# Patient Record
Sex: FEMALE | Race: BLACK OR AFRICAN AMERICAN | NOT HISPANIC OR LATINO | Employment: FULL TIME | URBAN - METROPOLITAN AREA
[De-identification: names, ages, dates, MRNs, and addresses within clinical notes are randomized per-mention and may not be internally consistent; named-entity substitution may affect disease eponyms.]

---

## 2017-04-28 ENCOUNTER — HOSPITAL ENCOUNTER (OUTPATIENT)
Dept: MAMMOGRAPHY | Facility: HOSPITAL | Age: 57
Discharge: HOME | End: 2017-04-28
Attending: FAMILY MEDICINE | Admitting: FAMILY MEDICINE

## 2017-04-28 DIAGNOSIS — R92.8 ABNORMAL MAMMOGRAM: ICD-10-CM

## 2017-04-28 PROCEDURE — G0279 TOMOSYNTHESIS, MAMMO: HCPCS

## 2017-04-28 PROCEDURE — G0204 DX MAMMO INCL CAD BI: HCPCS

## 2017-04-28 PROCEDURE — 77062 BREAST TOMOSYNTHESIS BI: CPT | Performed by: RADIOLOGY

## 2017-04-28 PROCEDURE — 77066 DX MAMMO INCL CAD BI: CPT | Performed by: RADIOLOGY

## 2017-09-17 ENCOUNTER — HOSPITAL ENCOUNTER (EMERGENCY)
Facility: HOSPITAL | Age: 57
Discharge: HOME | End: 2017-09-17
Attending: EMERGENCY MEDICINE | Admitting: EMERGENCY MEDICINE

## 2017-09-17 VITALS
DIASTOLIC BLOOD PRESSURE: 91 MMHG | HEART RATE: 101 BPM | SYSTOLIC BLOOD PRESSURE: 131 MMHG | RESPIRATION RATE: 18 BRPM | TEMPERATURE: 98.2 F | OXYGEN SATURATION: 100 % | BODY MASS INDEX: 31.65 KG/M2 | HEIGHT: 65 IN | WEIGHT: 190 LBS

## 2017-09-17 DIAGNOSIS — S39.012A LUMBAR STRAIN, INITIAL ENCOUNTER: Primary | ICD-10-CM

## 2017-09-17 PROCEDURE — 99282 EMERGENCY DEPT VISIT SF MDM: CPT

## 2017-09-17 NOTE — ED PROVIDER NOTES
Subjective   HPI Comments: Susannah Romero is a 57 y.o. female with hypertension who presents to the ED s/p a fall. The patient reports that she slipped on a grape, twisted her left ankle and fell on a shelf of apples at 1030 today. She did not hit the floor but states that she twisted her body. The patient denies any LOC. She now complains of mid and lower back pain, some pain down her leg, and some minor ankle pain. She has tried taking aleve with minor relief. The patient is ambulatory and has no issues bearing weight on her ankle. She has no other complains at this time.     Patient is a 57 y.o. female presenting with fall.   History provided by:  Patient  Fall   Mechanism of injury: fall    Injury location:  Torso  Torso injury location:  Back  Incident location: [facility].  Time since incident: Onset 1030.  Arrived directly from scene: no    Fall:     Fall occurred:  Walking    Impact surface:  Unable to specify    Point of impact:  Unable to specify  Protective equipment: none    Suspicion of alcohol use: no    Suspicion of drug use: no    Tetanus status:  Unknown  Associated symptoms: back pain (Mid and lower)    Associated symptoms: no loss of consciousness        Review of Systems   Genitourinary:        No incontinence.   Musculoskeletal: Positive for back pain (Mid and lower). Negative for gait problem.   Neurological: Negative for loss of consciousness.        No classic sciatic symptoms.  No bowel or bladder issues.       History reviewed. No pertinent past medical history.    Allergies   Allergen Reactions   • Cephalexin Itching       Past Surgical History:   Procedure Laterality Date   • HYSTERECTOMY     • OOPHORECTOMY         Family History   Problem Relation Age of Onset   • Breast cancer Neg Hx    • Ovarian cancer Neg Hx        Social History     Social History   • Marital status:      Spouse name: N/A   • Number of children: N/A   • Years of education: N/A     Social History Main Topics   •  "Smoking status: Never Smoker   • Smokeless tobacco: None   • Alcohol use No   • Drug use: No   • Sexual activity: Defer     Other Topics Concern   • None     Social History Narrative   • None         Objective   Physical Exam   Constitutional: She is oriented to person, place, and time. She appears well-developed and well-nourished. No distress.   HENT:   Head: Normocephalic and atraumatic.   Eyes: Conjunctivae are normal. No scleral icterus.   Pulmonary/Chest: Effort normal and breath sounds normal. No respiratory distress.   Lungs are clear   Abdominal: Soft. There is tenderness.   Left flank tenderness   Musculoskeletal: Normal range of motion. She exhibits tenderness.   There are no bruises noted on her back. Tenderness to mid and lumbar spine and sacrum. Tenderness to palpation in lower paraspinal muscles left greater than right. Ankles are benign with good ROM and normal ROM for extremities      Neurological: She is alert and oriented to person, place, and time.   Straight leg raise test negative for sciatic pain 5/5 proximally and distally for both legs.   Skin: Skin is warm and dry.   Psychiatric: She has a normal mood and affect. Her behavior is normal.   Nursing note and vitals reviewed.      Procedures         ED Course  ED Course     No results found for this or any previous visit (from the past 24 hour(s)).  Note: In addition to lab results from this visit, the labs listed above may include labs taken at another facility or during a different encounter within the last 24 hours. Please correlate lab times with ED admission and discharge times for further clarification of the services performed during this visit.    No orders to display     Vitals:    09/17/17 1447 09/17/17 1448   BP:  131/91   Patient Position:  Sitting   Pulse:  101   Resp:  18   Temp: 98.2 °F (36.8 °C)    TempSrc: Oral    SpO2:  100%   Weight:  190 lb (86.2 kg)   Height:  65\" (165.1 cm)     Medications - No data to display  ECG/EMG " Results (last 24 hours)     ** No results found for the last 24 hours. **                        [facility]    Final diagnoses:   Lumbar strain, initial encounter       Documentation assistance provided by bailee Pelletier.  Information recorded by the scribe was done at my direction and has been verified and validated by me.     Jaguar Pelletier  09/17/17 3129       Kenneth Fagan MD  09/20/17 1312

## 2018-01-02 ENCOUNTER — TRANSCRIBE ORDERS (OUTPATIENT)
Dept: DIABETES SERVICES | Facility: HOSPITAL | Age: 58
End: 2018-01-02

## 2018-01-02 DIAGNOSIS — R73.03 PREDIABETES: Primary | ICD-10-CM

## 2018-01-03 ENCOUNTER — TRANSCRIBE ORDERS (OUTPATIENT)
Dept: ADMINISTRATIVE | Facility: HOSPITAL | Age: 58
End: 2018-01-03

## 2018-01-03 DIAGNOSIS — R10.31 RLQ ABDOMINAL PAIN: Primary | ICD-10-CM

## 2018-01-08 ENCOUNTER — APPOINTMENT (OUTPATIENT)
Dept: CT IMAGING | Facility: HOSPITAL | Age: 58
End: 2018-01-08

## 2018-01-08 ENCOUNTER — HOSPITAL ENCOUNTER (OUTPATIENT)
Dept: CT IMAGING | Facility: HOSPITAL | Age: 58
Discharge: HOME | End: 2018-01-08
Admitting: NURSE PRACTITIONER

## 2018-01-08 DIAGNOSIS — R10.31 RLQ ABDOMINAL PAIN: ICD-10-CM

## 2018-01-08 PROCEDURE — 0 DIATRIZOATE MEGLUMINE & SODIUM PER 1 ML: Performed by: NURSE PRACTITIONER

## 2018-01-08 PROCEDURE — 74176 CT ABD & PELVIS W/O CONTRAST: CPT

## 2018-01-08 RX ADMIN — DIATRIZOATE MEGLUMINE AND DIATRIZOATE SODIUM 15 ML: 660; 100 LIQUID ORAL; RECTAL at 08:00

## 2018-02-12 ENCOUNTER — HOSPITAL ENCOUNTER (OUTPATIENT)
Dept: DIABETES SERVICES | Facility: HOSPITAL | Age: 58
Setting detail: RECURRING SERIES
Discharge: HOME | End: 2018-02-12

## 2018-03-07 ENCOUNTER — TRANSCRIBE ORDERS (OUTPATIENT)
Dept: ADMINISTRATIVE | Facility: HOSPITAL | Age: 58
End: 2018-03-07

## 2018-03-07 DIAGNOSIS — R92.8 ABNORMAL MAMMOGRAPHY: Primary | ICD-10-CM

## 2018-03-21 ENCOUNTER — HOSPITAL ENCOUNTER (OUTPATIENT)
Dept: DIABETES SERVICES | Facility: HOSPITAL | Age: 58
Setting detail: RECURRING SERIES
Discharge: HOME | End: 2018-03-21

## 2018-05-22 ENCOUNTER — HOSPITAL ENCOUNTER (OUTPATIENT)
Dept: MAMMOGRAPHY | Facility: HOSPITAL | Age: 58
Discharge: HOME | End: 2018-05-22
Attending: FAMILY MEDICINE | Admitting: FAMILY MEDICINE

## 2018-05-22 DIAGNOSIS — R92.8 ABNORMAL MAMMOGRAPHY: ICD-10-CM

## 2018-05-22 PROCEDURE — G0279 TOMOSYNTHESIS, MAMMO: HCPCS

## 2018-05-22 PROCEDURE — 77066 DX MAMMO INCL CAD BI: CPT | Performed by: RADIOLOGY

## 2018-05-22 PROCEDURE — 77066 DX MAMMO INCL CAD BI: CPT

## 2018-05-22 PROCEDURE — 77062 BREAST TOMOSYNTHESIS BI: CPT | Performed by: RADIOLOGY

## 2018-06-22 NOTE — DISCHARGE INSTRUCTIONS
Follow up with Dr. Barton if symptoms do not improve in 1 week.    Limit activity as tolerated    Immediately return to the emergency department for any new or worsening symptoms.      none

## 2019-02-08 ENCOUNTER — LAB REQUISITION (OUTPATIENT)
Dept: LAB | Facility: HOSPITAL | Age: 59
End: 2019-02-08

## 2019-02-08 DIAGNOSIS — Z00.00 ROUTINE GENERAL MEDICAL EXAMINATION AT A HEALTH CARE FACILITY: ICD-10-CM

## 2019-02-08 LAB — D DIMER PPP FEU-MCNC: 1.79 MCGFEU/ML (ref 0–0.56)

## 2019-02-08 PROCEDURE — 85379 FIBRIN DEGRADATION QUANT: CPT

## 2019-02-11 ENCOUNTER — TRANSCRIBE ORDERS (OUTPATIENT)
Dept: ADMINISTRATIVE | Facility: HOSPITAL | Age: 59
End: 2019-02-11

## 2019-02-11 DIAGNOSIS — R79.89 ELEVATED D-DIMER: Primary | ICD-10-CM

## 2019-02-12 ENCOUNTER — HOSPITAL ENCOUNTER (OUTPATIENT)
Dept: CT IMAGING | Facility: HOSPITAL | Age: 59
Discharge: HOME | End: 2019-02-12
Admitting: FAMILY MEDICINE

## 2019-02-12 DIAGNOSIS — R79.89 ELEVATED D-DIMER: ICD-10-CM

## 2019-02-12 PROCEDURE — 82565 ASSAY OF CREATININE: CPT

## 2019-02-12 PROCEDURE — 0 IOPAMIDOL PER 1 ML: Performed by: FAMILY MEDICINE

## 2019-02-12 PROCEDURE — 71275 CT ANGIOGRAPHY CHEST: CPT

## 2019-02-12 RX ADMIN — IOPAMIDOL 80 ML: 755 INJECTION, SOLUTION INTRAVENOUS at 09:35

## 2019-02-12 NOTE — NURSING NOTE
Office called and spoke with Dr. Patel. Information relayed regarding the preliminary results CT Chest. Positive for PE, details discussed per Dr. Denney's findings. Full report to follow in [facility].

## 2019-02-13 LAB — CREAT BLDA-MCNC: 0.8 MG/DL (ref 0.6–1.3)

## 2019-02-22 ENCOUNTER — TRANSCRIBE ORDERS (OUTPATIENT)
Dept: ADMINISTRATIVE | Facility: HOSPITAL | Age: 59
End: 2019-02-22

## 2019-02-22 DIAGNOSIS — I26.99 MULTIPLE PULMONARY EMBOLI (HCC): Primary | ICD-10-CM

## 2019-02-26 ENCOUNTER — HOSPITAL ENCOUNTER (OUTPATIENT)
Dept: CARDIOLOGY | Facility: HOSPITAL | Age: 59
Discharge: HOME | End: 2019-02-26
Admitting: FAMILY MEDICINE

## 2019-02-26 VITALS — HEIGHT: 63 IN | BODY MASS INDEX: 30.65 KG/M2 | WEIGHT: 173 LBS

## 2019-02-26 DIAGNOSIS — I26.99 MULTIPLE PULMONARY EMBOLI (HCC): ICD-10-CM

## 2019-02-26 PROCEDURE — 93970 EXTREMITY STUDY: CPT

## 2019-02-26 PROCEDURE — 93970 EXTREMITY STUDY: CPT | Performed by: INTERNAL MEDICINE

## 2019-02-27 LAB
BH CV LOWER VASCULAR LEFT COMMON FEMORAL AUGMENT: NORMAL
BH CV LOWER VASCULAR LEFT COMMON FEMORAL COMPRESS: NORMAL
BH CV LOWER VASCULAR LEFT COMMON FEMORAL PHASIC: NORMAL
BH CV LOWER VASCULAR LEFT COMMON FEMORAL SPONT: NORMAL
BH CV LOWER VASCULAR LEFT DISTAL FEMORAL AUGMENT: NORMAL
BH CV LOWER VASCULAR LEFT DISTAL FEMORAL COMPRESS: NORMAL
BH CV LOWER VASCULAR LEFT DISTAL FEMORAL PHASIC: NORMAL
BH CV LOWER VASCULAR LEFT DISTAL FEMORAL SPONT: NORMAL
BH CV LOWER VASCULAR LEFT GASTRONEMIUS COMPRESS: NORMAL
BH CV LOWER VASCULAR LEFT GREATER SAPH AK COMPRESS: NORMAL
BH CV LOWER VASCULAR LEFT GREATER SAPH BK COMPRESS: NORMAL
BH CV LOWER VASCULAR LEFT LESSER SAPH COMPRESS: NORMAL
BH CV LOWER VASCULAR LEFT MID FEMORAL AUGMENT: NORMAL
BH CV LOWER VASCULAR LEFT MID FEMORAL COMPRESS: NORMAL
BH CV LOWER VASCULAR LEFT MID FEMORAL PHASIC: NORMAL
BH CV LOWER VASCULAR LEFT MID FEMORAL SPONT: NORMAL
BH CV LOWER VASCULAR LEFT PERONEAL COMPRESS: NORMAL
BH CV LOWER VASCULAR LEFT POPLITEAL AUGMENT: NORMAL
BH CV LOWER VASCULAR LEFT POPLITEAL COMPRESS: NORMAL
BH CV LOWER VASCULAR LEFT POPLITEAL PHASIC: NORMAL
BH CV LOWER VASCULAR LEFT POPLITEAL SPONT: NORMAL
BH CV LOWER VASCULAR LEFT POSTERIOR TIBIAL COMPRESS: NORMAL
BH CV LOWER VASCULAR LEFT PROFUNDA FEMORAL AUGMENT: NORMAL
BH CV LOWER VASCULAR LEFT PROFUNDA FEMORAL COMPRESS: NORMAL
BH CV LOWER VASCULAR LEFT PROFUNDA FEMORAL PHASIC: NORMAL
BH CV LOWER VASCULAR LEFT PROFUNDA FEMORAL SPONT: NORMAL
BH CV LOWER VASCULAR LEFT PROXIMAL FEMORAL AUGMENT: NORMAL
BH CV LOWER VASCULAR LEFT PROXIMAL FEMORAL COMPRESS: NORMAL
BH CV LOWER VASCULAR LEFT PROXIMAL FEMORAL PHASIC: NORMAL
BH CV LOWER VASCULAR LEFT PROXIMAL FEMORAL SPONT: NORMAL
BH CV LOWER VASCULAR LEFT SAPHENOFEMORAL JUNCTION COMPRESS: NORMAL
BH CV LOWER VASCULAR LEFT SAPHENOFEMORAL JUNCTION SPONT: NORMAL
BH CV LOWER VASCULAR RIGHT COMMON FEMORAL AUGMENT: NORMAL
BH CV LOWER VASCULAR RIGHT COMMON FEMORAL COMPRESS: NORMAL
BH CV LOWER VASCULAR RIGHT COMMON FEMORAL PHASIC: NORMAL
BH CV LOWER VASCULAR RIGHT COMMON FEMORAL SPONT: NORMAL
BH CV LOWER VASCULAR RIGHT DISTAL FEMORAL AUGMENT: NORMAL
BH CV LOWER VASCULAR RIGHT DISTAL FEMORAL COMPRESS: NORMAL
BH CV LOWER VASCULAR RIGHT DISTAL FEMORAL PHASIC: NORMAL
BH CV LOWER VASCULAR RIGHT DISTAL FEMORAL SPONT: NORMAL
BH CV LOWER VASCULAR RIGHT GASTRONEMIUS COMPRESS: NORMAL
BH CV LOWER VASCULAR RIGHT GREATER SAPH AK COMPRESS: NORMAL
BH CV LOWER VASCULAR RIGHT GREATER SAPH BK COMPRESS: NORMAL
BH CV LOWER VASCULAR RIGHT LESSER SAPH COMPRESS: NORMAL
BH CV LOWER VASCULAR RIGHT MID FEMORAL AUGMENT: NORMAL
BH CV LOWER VASCULAR RIGHT MID FEMORAL COMPRESS: NORMAL
BH CV LOWER VASCULAR RIGHT MID FEMORAL PHASIC: NORMAL
BH CV LOWER VASCULAR RIGHT MID FEMORAL SPONT: NORMAL
BH CV LOWER VASCULAR RIGHT PERONEAL COMPRESS: NORMAL
BH CV LOWER VASCULAR RIGHT POPLITEAL AUGMENT: NORMAL
BH CV LOWER VASCULAR RIGHT POPLITEAL COMPRESS: NORMAL
BH CV LOWER VASCULAR RIGHT POPLITEAL PHASIC: NORMAL
BH CV LOWER VASCULAR RIGHT POPLITEAL SPONT: NORMAL
BH CV LOWER VASCULAR RIGHT POSTERIOR TIBIAL COMPRESS: NORMAL
BH CV LOWER VASCULAR RIGHT PROFUNDA FEMORAL AUGMENT: NORMAL
BH CV LOWER VASCULAR RIGHT PROFUNDA FEMORAL COMPRESS: NORMAL
BH CV LOWER VASCULAR RIGHT PROFUNDA FEMORAL PHASIC: NORMAL
BH CV LOWER VASCULAR RIGHT PROFUNDA FEMORAL SPONT: NORMAL
BH CV LOWER VASCULAR RIGHT PROXIMAL FEMORAL AUGMENT: NORMAL
BH CV LOWER VASCULAR RIGHT PROXIMAL FEMORAL COMPRESS: NORMAL
BH CV LOWER VASCULAR RIGHT PROXIMAL FEMORAL PHASIC: NORMAL
BH CV LOWER VASCULAR RIGHT PROXIMAL FEMORAL SPONT: NORMAL
BH CV LOWER VASCULAR RIGHT SAPHENOFEMORAL JUNCTION COMPRESS: NORMAL
BH CV LOWER VASCULAR RIGHT SAPHENOFEMORAL JUNCTION SPONT: NORMAL

## 2019-03-08 ENCOUNTER — CONSULT (OUTPATIENT)
Dept: ONCOLOGY | Facility: CLINIC | Age: 59
End: 2019-03-08

## 2019-03-08 VITALS
TEMPERATURE: 97.6 F | WEIGHT: 172 LBS | SYSTOLIC BLOOD PRESSURE: 156 MMHG | DIASTOLIC BLOOD PRESSURE: 90 MMHG | HEART RATE: 78 BPM | OXYGEN SATURATION: 98 % | HEIGHT: 63 IN | BODY MASS INDEX: 30.48 KG/M2 | RESPIRATION RATE: 12 BRPM

## 2019-03-08 DIAGNOSIS — I26.99 RIGHT PULMONARY EMBOLUS (HCC): Primary | ICD-10-CM

## 2019-03-08 PROCEDURE — 99204 OFFICE O/P NEW MOD 45 MIN: CPT | Performed by: INTERNAL MEDICINE

## 2019-03-08 RX ORDER — METOPROLOL TARTRATE 50 MG/1
100 TABLET, FILM COATED ORAL 2 TIMES DAILY
COMMUNITY

## 2019-03-08 NOTE — PROGRESS NOTES
DATE OF CONSULTATION: 3/8/2019    REFERRING PHYSICIAN: Fany Barton MD    Dear Fany Patton MD  Thank you for asking for my medical advice on this patient. I saw her in the  [place] office on 3/8/2019    REASON FOR CONSULTATION: Right pulmonary embolism    HISTORY OF PRESENT ILLNESS: The patient is a very pleasant 58 y.o.  female   who was in her usual state of health until January 2019.  The patient fell in her garage and hit her right side.  She had severe pain edema in her right calf.  This lasted for approximately 1 month.  Early February 2019 patient presented with shortness of breath as well as pain in her right lower chest.  Patient was started on Xarelto.  CT chest PE protocol document multiple blood clots right lung.  Venous Doppler lower extremities were negative.  Patient was referred to me for further recommendations.    SUBJECTIVE: When I saw the patient today she is feeling significantly better.  Her breathing is back to normal.  She has mild pain right lower chest.  Her leg pain has resolved.  Patient never had a blood clot before.  She has no family history of blood clots.  Patient has been through surgery before hysterectomy without any venous thrombotic events.    Review of Systems   Constitutional: Negative for activity change, appetite change, chills, fatigue, fever and unexpected weight change.   HENT: Negative for hearing loss, mouth sores, nosebleeds, sore throat and trouble swallowing.    Eyes: Negative for visual disturbance.   Respiratory: Negative for cough, chest tightness, shortness of breath and wheezing.    Cardiovascular: Positive for chest pain. Negative for palpitations and leg swelling.   Gastrointestinal: Negative for abdominal distention, abdominal pain, blood in stool, constipation, diarrhea, nausea, rectal pain and vomiting.   Endocrine: Negative for cold intolerance and heat intolerance.   Genitourinary: Negative for difficulty urinating, dysuria, frequency and  urgency.   Musculoskeletal: Negative for arthralgias, back pain, gait problem, joint swelling and myalgias.   Skin: Negative for rash.   Neurological: Negative for dizziness, tremors, syncope, weakness, light-headedness, numbness and headaches.   Hematological: Negative for adenopathy. Does not bruise/bleed easily.   Psychiatric/Behavioral: Negative for confusion, sleep disturbance and suicidal ideas. The patient is not nervous/anxious.        Past Medical History:   Diagnosis Date   • Diabetes mellitus (CMS/[facility])    • Hypertension        Social History     Socioeconomic History   • Marital status:      Spouse name: Not on file   • Number of children: Not on file   • Years of education: Not on file   • Highest education level: Not on file   Social Needs   • Financial resource strain: Not on file   • Food insecurity - worry: Not on file   • Food insecurity - inability: Not on file   • Transportation needs - medical: Not on file   • Transportation needs - non-medical: Not on file   Occupational History   • Not on file   Tobacco Use   • Smoking status: Never Smoker   • Smokeless tobacco: Never Used   Substance and Sexual Activity   • Alcohol use: No   • Drug use: No   • Sexual activity: Defer   Other Topics Concern   • Not on file   Social History Narrative   • Not on file       Family History   Problem Relation Age of Onset   • Breast cancer Neg Hx    • Ovarian cancer Neg Hx    • BRCA 1/2 Neg Hx        Past Surgical History:   Procedure Laterality Date   • HYSTERECTOMY     • OOPHORECTOMY         Allergies   Allergen Reactions   • Cephalexin Itching          Current Outpatient Medications:   •  benazepril-hydrochlorthiazide (LOTENSIN HCT) 10-12.5 MG per tablet, Take 1 tablet by mouth Daily., Disp: , Rfl:   •  metoprolol tartrate (LOPRESSOR) 25 MG tablet, Take 25 mg by mouth Daily., Disp: , Rfl:   •  rivaroxaban (XARELTO) 20 MG tablet, Take 20 mg by mouth Daily., Disp: , Rfl:   •  escitalopram (LEXAPRO) 20 MG  "tablet, Take 20 mg by mouth Daily., Disp: , Rfl:     PHYSICAL EXAMINATION:   /90   Pulse 78   Temp 97.6 °F (36.4 °C) (Temporal)   Resp 12   Ht 160 cm (63\")   Wt 78 kg (172 lb)   SpO2 98%   BMI 30.47 kg/m²    ECOG Performance Status: 1 - Symptomatic but completely ambulatory  General Appearance:  alert, cooperative, no apparent distress and appears stated age   Neurologic/Psychiatric: A&O x 3, gait steady, appropriate affect, strength 5/5 in all muscle groups   HEENT:  Normocephalic, without obvious abnormality, mucous membranes moist   Neck: Supple, symmetrical, trachea midline, no adenopathy;  No thyromegaly, masses, or tenderness   Lungs:   Clear to auscultation bilaterally; respirations regular, even, and unlabored bilaterally   Heart:  Regular rate and rhythm, no murmurs appreciated   Abdomen:   Soft, non-tender, non-distended and no organomegaly   Lymph nodes: No cervical, supraclavicular, inguinal or axillary adenopathy noted   Extremities: Normal, atraumatic; no clubbing, cyanosis, or edema    Skin: No rashes, ulcers, or suspicious lesions noted       Hospital Outpatient Visit on 02/26/2019   Component Date Value Ref Range Status   • Right Common Femoral Spont 02/26/2019 Y   Final   • Right Common Femoral Phasic 02/26/2019 Y   Final   • Right Common Femoral Augment 02/26/2019 Y   Final   • Right Common Femoral Compress 02/26/2019 C   Final   • Right Saphenofemoral Junction Spont 02/26/2019 Y   Final   • Right Saphenofemoral Junction Comp* 02/26/2019 C   Final   • Right Profunda Femoral Spont 02/26/2019 Y   Final   • Right Profunda Femoral Phasic 02/26/2019 Y   Final   • Right Profunda Femoral Augment 02/26/2019 Y   Final   • Right Profunda Femoral Compress 02/26/2019 C   Final   • Right Proximal Femoral Spont 02/26/2019 Y   Final   • Right Proximal Femoral Phasic 02/26/2019 Y   Final   • Right Proximal Femoral Augment 02/26/2019 Y   Final   • Right Proximal Femoral Compress 02/26/2019 C   Final "   • Right Mid Femoral Spont 02/26/2019 Y   Final   • Right Mid Femoral Phasic 02/26/2019 Y   Final   • Right Mid Femoral Augment 02/26/2019 Y   Final   • Right Mid Femoral Compress 02/26/2019 C   Final   • Right Distal Femoral Spont 02/26/2019 Y   Final   • Right Distal Femoral Phasic 02/26/2019 Y   Final   • Right Distal Femoral Augment 02/26/2019 Y   Final   • Right Distal Femoral Compress 02/26/2019 C   Final   • Right Popliteal Spont 02/26/2019 Y   Final   • Right Popliteal Phasic 02/26/2019 Y   Final   • Right Popliteal Augment 02/26/2019 Y   Final   • Right Popliteal Compress 02/26/2019 C   Final   • Right Posterior Tibial Compress 02/26/2019 C   Final   • Right Peroneal Compress 02/26/2019 C   Final   • Right GastronemiusSoleal Compress 02/26/2019 C   Final   • Right Greater Saph AK Compress 02/26/2019 C   Final   • Right Greater Saph BK Compress 02/26/2019 C   Final   • Right Lesser Saph Compress 02/26/2019 C   Final   • Left Common Femoral Spont 02/26/2019 Y   Final   • Left Common Femoral Phasic 02/26/2019 Y   Final   • Left Common Femoral Augment 02/26/2019 Y   Final   • Left Common Femoral Compress 02/26/2019 C   Final   • Left Saphenofemoral Junction Spont 02/26/2019 Y   Final   • Left Saphenofemoral Junction Compr* 02/26/2019 C   Final   • Left Profunda Femoral Spont 02/26/2019 Y   Final   • Left Profunda Femoral Phasic 02/26/2019 Y   Final   • Left Profunda Femoral Augment 02/26/2019 Y   Final   • Left Profunda Femoral Compress 02/26/2019 C   Final   • Left Proximal Femoral Spont 02/26/2019 Y   Final   • Left Proximal Femoral Phasic 02/26/2019 Y   Final   • Left Proximal Femoral Augment 02/26/2019 Y   Final   • Left Proximal Femoral Compress 02/26/2019 C   Final   • Left Mid Femoral Spont 02/26/2019 Y   Final   • Left Mid Femoral Phasic 02/26/2019 Y   Final   • Left Mid Femoral Augment 02/26/2019 Y   Final   • Left Mid Femoral Compress 02/26/2019 C   Final   • Left Distal Femoral Spont 02/26/2019 Y    Final   • Left Distal Femoral Phasic 02/26/2019 Y   Final   • Left Distal Femoral Augment 02/26/2019 Y   Final   • Left Distal Femoral Compress 02/26/2019 C   Final   • Left Popliteal Spont 02/26/2019 Y   Final   • Left Popliteal Phasic 02/26/2019 Y   Final   • Left Popliteal Augment 02/26/2019 Y   Final   • Left Popliteal Compress 02/26/2019 C   Final   • Left Posterior Tibial Compress 02/26/2019 C   Final   • Left Peroneal Compress 02/26/2019 C   Final   • Left GastronemiusSoleal Compress 02/26/2019 C   Final   • Left Greater Saph AK Compress 02/26/2019 C   Final   • Left Greater Saph BK Compress 02/26/2019 C   Final   • Left Lesser Saph Compress 02/26/2019 C   Final        Ct Angiogram Chest With & Without Contrast    Result Date: 2/12/2019  Narrative: EXAMINATION: CT ANGIOGRAM CHEST W WO CONTRAST-  INDICATION: Elevated D-dimer; R79.89-Other specified abnormal findings of blood chemistry.  TECHNIQUE:  Axial CT data of the chest were obtained helically per PE protocol following IV contrast administration.  Multiplanar reformatted images and 2D reconstructions (MIPs) were generated and reviewed. Computer aided detection was utilized in the interpretation. The radiation dose reduction device was turned on for each scan per the ALARA (As Low as Reasonably Achievable) protocol.  COMPARISONS:  Chest radiograph 01/24/2007.  FINDINGS: Segmental acute pulmonary emboli are seen to the superior segment of the right upper lobe and some of the basal segments of the right lower lobe. There are changes of pulmonary infarction in the aerated portion of the right lower lobe. There is a small right pleural effusion. Mild background emphysema.  The airways are patent centrally. Heart size is within normal limits; no evidence of right heart strain. No enlarged lymph node. Incidental imaging of the upper abdomen remarkable only for a small hepatic cyst. Normal thoracic vertebral body heights.      Impression: 1. Segmental acute  pulmonary emboli to the superior segment of the right upper lobe and some right lower lobe basal segments; small right lower lobe pulmonary infarct and trace effusion. 2. No evidence of right heart strain.  NOTE: The requesting clinician was contacted by radiology nursing at 10:29 AM on 02/12/2019.  D:  02/12/2019 E:  02/12/2019  This report was finalized on 2/12/2019 11:05 AM by Jerrell Dennye.          DIAGNOSTIC DATA:   1. Radiology: As above  2. Dr. Chavez's note from February 8, 2019 reviewed by me and documented in the  chart.   3. Pathology report: None  4. Laboratory data:     Results for PEDRO PRASAD (MRN [id number]) as of 3/8/2019 11:21   Ref. Range 3/4/2016 04:57   WBC Latest Ref Range: 3.50 - 10.80 K/mcL 8.78   RBC Latest Ref Range: 3.89 - 5.14 M/mcL 4.51   Hemoglobin Latest Ref Range: 11.5 - 15.5 g/dL 13.1   Hematocrit Latest Ref Range: 34.5 - 44.0 % 39.0   RDW-CV Latest Ref Range: 11.3 - 14.5 % 12.8   MCV Latest Ref Range: 80.0 - 99.0 fL 86.5   MCH Latest Ref Range: 27.0 - 31.0 pg 29.0   MCHC Latest Ref Range: 32.0 - 36.0 g/dL 33.6   Platelets Latest Ref Range: 150 - 450 K/mcL 366     ASSESSMENT: The patient is a very pleasant 58 y.o.  female  with right-sided PE    PROBLEM LIST:   1.  Right-sided pulmonary embolisms:  A.  Provoked by right lower extremity trauma January 2019  B.  Diagnosed February 12, 2019 after CT PE protocol  C.  On Xarelto since February 5, 2019  2.  Hypertension  3.  Depression  4.  Obesity    PLAN:   1. I had a long discussion today with the patient about her  new diagnosis of pulmonary embolism. I reviewed the patient's documents including refereing provider's notes, lab results, and imaging report.   2.  Regarding etiology for clots most likely this was induced by trauma.  Another risk factor is obesity.  3.  Regarding treatment, patient is currently on Xarelto which is FDA approved agent.  4.  Regarding etiology of treatment.  Since this sounds like a provoked episode I  recommend 6 months of anticoagulation.  At that point I will stop her Xarelto for 2 weeks and 2 for thrombophilia workup everything comes back negative I will stop anticoagulation completely.  On the other hand if he does have inherited risk factors we will talk about the potential benefit of extended treatment.  5.  The patient was advised to exercise and lose weight.  6. We will continue metoprolol Benzapril and HCTZ for hypertension.  7.  We will continue Lexapro daily for depression.    Karena Stiles MD  3/8/2019

## 2019-03-20 ENCOUNTER — TRANSCRIBE ORDERS (OUTPATIENT)
Dept: ADMINISTRATIVE | Facility: HOSPITAL | Age: 59
End: 2019-03-20

## 2019-03-20 DIAGNOSIS — I49.3 FREQUENT UNIFOCAL PVCS: Primary | ICD-10-CM

## 2019-04-08 ENCOUNTER — HOSPITAL ENCOUNTER (OUTPATIENT)
Dept: CARDIOLOGY | Facility: HOSPITAL | Age: 59
Discharge: HOME | End: 2019-04-08
Admitting: FAMILY MEDICINE

## 2019-04-08 VITALS — WEIGHT: 172 LBS | BODY MASS INDEX: 30.48 KG/M2 | HEIGHT: 63 IN

## 2019-04-08 DIAGNOSIS — I49.3 FREQUENT UNIFOCAL PVCS: ICD-10-CM

## 2019-04-08 LAB
BH CV ECHO MEAS - AO ROOT AREA (BSA CORRECTED): 1.4
BH CV ECHO MEAS - AO ROOT AREA: 4.9 CM^2
BH CV ECHO MEAS - AO ROOT DIAM: 2.5 CM
BH CV ECHO MEAS - BSA(HAYCOCK): 1.9 M^2
BH CV ECHO MEAS - BSA: 1.8 M^2
BH CV ECHO MEAS - BZI_BMI: 30.5 KILOGRAMS/M^2
BH CV ECHO MEAS - BZI_METRIC_HEIGHT: 160 CM
BH CV ECHO MEAS - BZI_METRIC_WEIGHT: 78 KG
BH CV ECHO MEAS - EDV(CUBED): 69.4 ML
BH CV ECHO MEAS - EDV(TEICH): 74.6 ML
BH CV ECHO MEAS - EF(CUBED): 71.9 %
BH CV ECHO MEAS - EF(MOD-BP): 64 %
BH CV ECHO MEAS - EF(TEICH): 64.1 %
BH CV ECHO MEAS - ESV(CUBED): 19.5 ML
BH CV ECHO MEAS - ESV(TEICH): 26.8 ML
BH CV ECHO MEAS - FS: 34.5 %
BH CV ECHO MEAS - IVS/LVPW: 1
BH CV ECHO MEAS - IVSD: 0.8 CM
BH CV ECHO MEAS - LA DIMENSION: 2.5 CM
BH CV ECHO MEAS - LA/AO: 1
BH CV ECHO MEAS - LV MASS(C)D: 103 GRAMS
BH CV ECHO MEAS - LV MASS(C)DI: 56.8 GRAMS/M^2
BH CV ECHO MEAS - LVIDD: 4.1 CM
BH CV ECHO MEAS - LVIDS: 2.7 CM
BH CV ECHO MEAS - LVOT AREA (M): 3.1 CM^2
BH CV ECHO MEAS - LVOT AREA: 3.1 CM^2
BH CV ECHO MEAS - LVOT DIAM: 2 CM
BH CV ECHO MEAS - LVPWD: 0.8 CM
BH CV ECHO MEAS - SI(CUBED): 27.5 ML/M^2
BH CV ECHO MEAS - SI(TEICH): 26.3 ML/M^2
BH CV ECHO MEAS - SV(CUBED): 49.9 ML
BH CV ECHO MEAS - SV(TEICH): 47.8 ML
BH CV STRESS BP STAGE 1: NORMAL
BH CV STRESS BP STAGE 2: NORMAL
BH CV STRESS DURATION MIN STAGE 1: 3
BH CV STRESS DURATION MIN STAGE 2: 3
BH CV STRESS DURATION SEC STAGE 1: 0
BH CV STRESS DURATION SEC STAGE 2: 39
BH CV STRESS ECHO POST STRESS EJECTION FRACTION EF: 65 %
BH CV STRESS GRADE STAGE 1: 10
BH CV STRESS GRADE STAGE 2: 12
BH CV STRESS HR STAGE 1: 106
BH CV STRESS HR STAGE 2: 137
BH CV STRESS METS STAGE 1: 5
BH CV STRESS METS STAGE 2: 7.5
BH CV STRESS PROTOCOL 1: NORMAL
BH CV STRESS RECOVERY BP: NORMAL MMHG
BH CV STRESS RECOVERY HR: 80 BPM
BH CV STRESS SPEED STAGE 1: 1.7
BH CV STRESS SPEED STAGE 2: 2.5
BH CV STRESS STAGE 1: 1
BH CV STRESS STAGE 2: 2
BH CV VAS BP RIGHT ARM: NORMAL MMHG
LV EF 2D ECHO EST: 60 %
MAXIMAL PREDICTED HEART RATE: 162 BPM
PERCENT MAX PREDICTED HR: 87.04 %
STRESS BASELINE BP: NORMAL MMHG
STRESS BASELINE HR: 92 BPM
STRESS PERCENT HR: 102 %
STRESS POST ESTIMATED WORKLOAD: 7 METS
STRESS POST EXERCISE DUR MIN: 6 MIN
STRESS POST EXERCISE DUR SEC: 39 SEC
STRESS POST PEAK BP: NORMAL MMHG
STRESS POST PEAK HR: 141 BPM
STRESS TARGET HR: 138 BPM

## 2019-04-08 PROCEDURE — 93350 STRESS TTE ONLY: CPT | Performed by: INTERNAL MEDICINE

## 2019-04-08 PROCEDURE — 93017 CV STRESS TEST TRACING ONLY: CPT

## 2019-04-08 PROCEDURE — 93352 ADMIN ECG CONTRAST AGENT: CPT | Performed by: INTERNAL MEDICINE

## 2019-04-08 PROCEDURE — 93350 STRESS TTE ONLY: CPT

## 2019-04-08 PROCEDURE — 93018 CV STRESS TEST I&R ONLY: CPT | Performed by: INTERNAL MEDICINE

## 2019-04-08 PROCEDURE — 25010000002 SULFUR HEXAFLUORIDE MICROSPH 60.7-25 MG RECONSTITUTED SUSPENSION: Performed by: FAMILY MEDICINE

## 2019-04-08 RX ADMIN — SULFUR HEXAFLUORIDE 5 ML: KIT at 09:00

## 2019-08-09 ENCOUNTER — OFFICE VISIT (OUTPATIENT)
Dept: ONCOLOGY | Facility: CLINIC | Age: 59
End: 2019-08-09

## 2019-08-09 VITALS
HEART RATE: 64 BPM | OXYGEN SATURATION: 98 % | BODY MASS INDEX: 32.07 KG/M2 | WEIGHT: 181 LBS | TEMPERATURE: 97.6 F | DIASTOLIC BLOOD PRESSURE: 78 MMHG | SYSTOLIC BLOOD PRESSURE: 139 MMHG | RESPIRATION RATE: 20 BRPM

## 2019-08-09 DIAGNOSIS — I27.82 OTHER CHRONIC PULMONARY EMBOLISM WITHOUT ACUTE COR PULMONALE (HCC): Primary | ICD-10-CM

## 2019-08-09 PROCEDURE — 99213 OFFICE O/P EST LOW 20 MIN: CPT | Performed by: NURSE PRACTITIONER

## 2019-08-09 RX ORDER — TRIAMTERENE AND HYDROCHLOROTHIAZIDE 75; 50 MG/1; MG/1
1 TABLET ORAL DAILY
COMMUNITY
Start: 2019-06-09 | End: 2019-12-06

## 2019-08-09 NOTE — PROGRESS NOTES
DATE OF VISIT: 8/9/2019    REASON FOR VISIT: Followup for right pulmonary embolism       HISTORY OF PRESENT ILLNESS: The patient is a very pleasant 59 y.o. female who presents for follow-up on right pulmonary embolism.  She was in her usual state of health until January 2019.  The patient fell in her garage and hit her right side.  She had severe pain and edema in her right calf.  This lasted for approximately 1 month.  Early February 2019 patient presented with shortness of breath as well as pain in her right lower chest.  Patient was started on Xarelto.  CT chest PE protocol documented multiple blood clots in her right lung.  Venous Doppler lower extremities were negative.    SUBJECTIVE: Patient states she has done well on the Xarelto.  She has been worried about increased risk of bleeding while taking the Xarelto and feels like she has been over cautious.  She has no family history of blood clots.  No changes in medical history since last visit.    PAST MEDICAL HISTORY/SOCIAL HISTORY/FAMILY HISTORY: Reviewed by me and unchanged.    Review of Systems   Constitutional: Negative for appetite change, fatigue, fever and unexpected weight change.   HENT: Negative for mouth sores, sore throat and trouble swallowing.    Respiratory: Negative for cough, shortness of breath and wheezing.    Cardiovascular: Negative for chest pain, palpitations and leg swelling.   Gastrointestinal: Negative for abdominal distention, abdominal pain, constipation, diarrhea, nausea and vomiting.   Genitourinary: Negative for difficulty urinating, dysuria and frequency.   Musculoskeletal: Negative for arthralgias.   Skin: Negative for pallor, rash and wound.   Neurological: Negative for dizziness and weakness.   Hematological: Does not bruise/bleed easily.   Psychiatric/Behavioral: Negative for confusion and sleep disturbance. The patient is not nervous/anxious.        Current Outpatient Medications:   •  escitalopram (LEXAPRO) 20 MG tablet,  Take 20 mg by mouth Daily., Disp: , Rfl:   •  metoprolol tartrate (LOPRESSOR) 50 MG tablet, Take 25 mg by mouth 2 (Two) Times a Day., Disp: , Rfl:   •  rivaroxaban (XARELTO) 20 MG tablet, Take 20 mg by mouth Daily., Disp: , Rfl:   •  triamterene-hydrochlorothiazide (MAXZIDE) 75-50 MG per tablet, Take 1 tablet by mouth Daily., Disp: , Rfl:     PHYSICAL EXAMINATION:   /78   Pulse 64   Temp 97.6 °F (36.4 °C) (Temporal)   Resp 20   Wt 82.1 kg (181 lb)   SpO2 98%   BMI 32.07 kg/m²    ECOG Performance Status: 0 - Asymptomatic  General Appearance:  alert, cooperative, no apparent distress and appears stated age   Neurologic/Psychiatric: A&O x 3, gait steady, appropriate affect, strength 5/5 in all muscle groups   HEENT:  Normocephalic, without obvious abnormality, mucous membranes moist   Neck: Supple, symmetrical, trachea midline, no adenopathy;  No thyromegaly, masses, or tenderness   Lungs:   Clear to auscultation bilaterally; respirations regular, even, and unlabored bilaterally   Heart:  Regular rate and rhythm, no murmurs appreciated   Abdomen:   Soft, nontender, nondistended   Lymph nodes: No cervical, supraclavicular, inguinal or axillary adenopathy noted   Extremities: Normal, atraumatic; no clubbing, cyanosis, or edema    Skin: No rashes, ulcers, or suspicious lesions noted     No visits with results within 2 Week(s) from this visit.   Latest known visit with results is:   Hospital Outpatient Visit on 04/08/2019   Component Date Value Ref Range Status   • BH CV STRESS PROTOCOL 1 04/08/2019 Vadim   Final   • Stage 1 04/08/2019 1   Final   • Duration Min Stage 1 04/08/2019 3   Final   • Duration Sec Stage 1 04/08/2019 0   Final   • Grade Stage 1 04/08/2019 10   Final   • Speed Stage 1 04/08/2019 1.7   Final   •  CV STRESS METS STAGE 1 04/08/2019 5   Final   • Baseline HR 04/08/2019 92  bpm Final   • Baseline BP 04/08/2019 160/82  mmHg Final   • HR Stage 1 04/08/2019 106   Final   • BP Stage 1  04/08/2019 192/94   Final   • Stage 2 04/08/2019 2   Final   • HR Stage 2 04/08/2019 137   Final   • BP Stage 2 04/08/2019 222/98   Final   • Duration Min Stage 2 04/08/2019 3   Final   • Duration Sec Stage 2 04/08/2019 39   Final   • Grade Stage 2 04/08/2019 12   Final   • Speed Stage 2 04/08/2019 2.5   Final   • BH CV STRESS METS STAGE 2 04/08/2019 7.5   Final   • Exercise duration (min) 04/08/2019 6  min Final   • Exercise duration (sec) 04/08/2019 39  sec Final   • Peak HR 04/08/2019 141  bpm Final   • Peak BP 04/08/2019 222/98  mmHg Final   • Recovery HR 04/08/2019 80  bpm Final   • Recovery BP 04/08/2019 160/80  mmHg Final   • Estimated workload 04/08/2019 7.0  METS Final   • BSA 04/08/2019 1.8  m^2 Final   • IVSd 04/08/2019 0.8  cm Final   • LVIDd 04/08/2019 4.1  cm Final   • LVIDs 04/08/2019 2.7  cm Final   • LVPWd 04/08/2019 0.8  cm Final   • IVS/LVPW 04/08/2019 1.0   Final   • FS 04/08/2019 34.5  % Final   • EDV(Teich) 04/08/2019 74.6  ml Final   • ESV(Teich) 04/08/2019 26.8  ml Final   • EF(Teich) 04/08/2019 64.1  % Final   • EDV(cubed) 04/08/2019 69.4  ml Final   • ESV(cubed) 04/08/2019 19.5  ml Final   • EF(cubed) 04/08/2019 71.9  % Final   • LV mass(C)d 04/08/2019 103.0  grams Final   • LV mass(C)dI 04/08/2019 56.8  grams/m^2 Final   • SV(Teich) 04/08/2019 47.8  ml Final   • SI(Teich) 04/08/2019 26.3  ml/m^2 Final   • SV(cubed) 04/08/2019 49.9  ml Final   • SI(cubed) 04/08/2019 27.5  ml/m^2 Final   • Ao root diam 04/08/2019 2.5  cm Final   • Ao root area 04/08/2019 4.9  cm^2 Final   • LA dimension 04/08/2019 2.5  cm Final   • LA/Ao 04/08/2019 1.0   Final   • LVOT diam 04/08/2019 2.0  cm Final   • LVOT area 04/08/2019 3.1  cm^2 Final   • LVOT area(traced) 04/08/2019 3.1  cm^2 Final   • Ao root area (BSA corrected) 04/08/2019 1.4   Final   •  CV ECHO RADHA - BZI_BMI 04/08/2019 30.5  kilograms/m^2 Final   •  CV ECHO RADHA - BSA(HAYCOCK) 04/08/2019 1.9  m^2 Final   •  CV ECHO RADHA - BZI_METRIC_WEIGHT  04/08/2019 78.0  kg Final   • BH CV ECHO RADHA - BZI_METRIC_HEIGHT 04/08/2019 160.0  cm Final   • Percent Target HR 04/08/2019 102  % Final   • Percent Max Pred HR 04/08/2019 87.04  % Final   • Target HR (85%) 04/08/2019 138  bpm Final   • Max. Pred. HR (100%) 04/08/2019 162  bpm Final   • BH CV VAS BP RIGHT ARM 04/08/2019 160/82  mmHg Final   • Echo EF Estimated 04/08/2019 60  % Final   • PostStressEF 04/08/2019 65  % Final   • EF(MOD-bp) 04/08/2019 64  % Final        No results found.    ASSESSMENT: The patient is a very pleasant 58 y.o.  female  with right-sided PE     PROBLEM LIST:   1.  Right-sided pulmonary embolisms:  A.  Provoked by right lower extremity trauma January 2019  B.  Diagnosed February 12, 2019 after CT PE protocol  C.  On Xarelto since February 5, 2019, stopped on 8/9/2019.  2.  Hypertension  3.  Depression  4.  Obesity     PLAN:   1.  I had discussion today with the patient about her recent diagnosis and treatment of pulmonary embolism.  2.  Regarding etiology for clots most likely this was induced by trauma.  Another risk factor is obesity.  3.  Regarding treatment, patient was treated with Xarelto which is FDA approved agent for 6 months.    4.  Regarding etiology of treatment.  Since this sounds like a provoked episode recommended 6 months of anticoagulation.  Xarelto was stopped today with thrombophilia work-up ordered for 2 weeks.  If thrombophilia work-up comes back negative will stop anticoagulation completely.  On the other hand, if she does have inherited risk factors we will talk about the potential benefit of extended treatment.  5.  The patient was advised to exercise and lose weight.  6.  We will continue metoprolol and Maxzide for hypertension.  7.  We will continue Lexapro daily for depression.  8.  Will follow up with patient in 1 year or sooner pending results of thrombophilia work-up.        nAna Bentley, APRN    8/9/2019

## 2019-08-23 ENCOUNTER — LAB (OUTPATIENT)
Dept: LAB | Facility: HOSPITAL | Age: 59
End: 2019-08-23

## 2019-08-23 DIAGNOSIS — I27.82 OTHER CHRONIC PULMONARY EMBOLISM WITHOUT ACUTE COR PULMONALE (HCC): ICD-10-CM

## 2019-08-23 PROCEDURE — 85670 THROMBIN TIME PLASMA: CPT

## 2019-08-23 PROCEDURE — 85613 RUSSELL VIPER VENOM DILUTED: CPT

## 2019-08-23 PROCEDURE — 36415 COLL VENOUS BLD VENIPUNCTURE: CPT

## 2019-08-23 PROCEDURE — 85306 CLOT INHIBIT PROT S FREE: CPT

## 2019-08-23 PROCEDURE — 85210 CLOT FACTOR II PROTHROM SPEC: CPT

## 2019-08-23 PROCEDURE — 81241 F5 GENE: CPT

## 2019-08-23 PROCEDURE — 85705 THROMBOPLASTIN INHIBITION: CPT

## 2019-08-23 PROCEDURE — 85303 CLOT INHIBIT PROT C ACTIVITY: CPT

## 2019-08-23 PROCEDURE — 85300 ANTITHROMBIN III ACTIVITY: CPT

## 2019-08-23 PROCEDURE — 85305 CLOT INHIBIT PROT S TOTAL: CPT

## 2019-08-23 PROCEDURE — 85732 THROMBOPLASTIN TIME PARTIAL: CPT

## 2019-08-23 PROCEDURE — 86146 BETA-2 GLYCOPROTEIN ANTIBODY: CPT

## 2019-08-23 PROCEDURE — 85302 CLOT INHIBIT PROT C ANTIGEN: CPT

## 2019-08-23 PROCEDURE — 86147 CARDIOLIPIN ANTIBODY EA IG: CPT

## 2019-08-24 LAB — F5 GENE MUT ANL BLD/T: NORMAL

## 2019-08-26 LAB
CARDIOLIPIN IGG SER IA-ACNC: <9 GPL U/ML (ref 0–14)
CARDIOLIPIN IGM SER IA-ACNC: <9 MPL U/ML (ref 0–12)
LA NT DPL PPP: 39.9 SEC (ref 0–55)
LA NT DPL/LA NT HPL PPP-RTO: 1.17 RATIO (ref 0–1.4)
LA NT PLATELET PPP: 32.5 SEC (ref 0–51.9)
LUPUS ANTICOAGULANT REFLEX: NORMAL
SCREEN DRVVT: 37.4 SEC (ref 0–47)
THROMBIN TIME: 18.1 SEC (ref 0–23)

## 2019-08-27 LAB
AT III PPP CHRO-ACNC: 124 % (ref 75–135)
PROTEIN S-FUNCTIONAL: 49 % (ref 63–140)
PROTHROM ACT/NOR PPP: 129 % (ref 50–154)

## 2019-08-28 LAB
B2 GLYCOPROT1 IGA SER-ACNC: <9 GPI IGA UNITS (ref 0–25)
B2 GLYCOPROT1 IGG SER-ACNC: <9 GPI IGG UNITS (ref 0–20)
B2 GLYCOPROT1 IGM SER-ACNC: <9 GPI IGM UNITS (ref 0–32)
PRT C ACTIVITY (CHROMOGENIC): 129 %

## 2019-08-29 LAB
PROT C AG PPP IA-ACNC: 139 % (ref 60–150)
PROT S FREE PPP-ACNC: 41 % (ref 57–157)
PROT S PPP-ACNC: 106 % (ref 60–150)

## 2019-09-18 ENCOUNTER — TELEPHONE (OUTPATIENT)
Dept: ONCOLOGY | Facility: CLINIC | Age: 59
End: 2019-09-18

## 2019-09-18 NOTE — TELEPHONE ENCOUNTER
----- Message from Nadia Maharaj sent at 9/17/2019  4:15 PM EDT -----  Regarding: [facility]-LAB RESULTS  Contact: [phone]  Patient called and wants to get her lab results.

## 2019-09-18 NOTE — TELEPHONE ENCOUNTER
After Dr. Stiles review labs I returned call to patient to report that due to protein s deficiency that patient will restart xarelto daily for the rest of her life.  Dr. Stiles does want a follow up visit with patient in the next 2-3 weeks to review lab results.  Patient verbalized understanding.  Kim to schedule and call patient with date and time.

## 2019-10-01 ENCOUNTER — TRANSCRIBE ORDERS (OUTPATIENT)
Dept: ADMINISTRATIVE | Facility: HOSPITAL | Age: 59
End: 2019-10-01

## 2019-10-01 DIAGNOSIS — Z12.31 VISIT FOR SCREENING MAMMOGRAM: Primary | ICD-10-CM

## 2019-10-02 ENCOUNTER — HOSPITAL ENCOUNTER (OUTPATIENT)
Dept: MAMMOGRAPHY | Facility: HOSPITAL | Age: 59
Discharge: HOME | End: 2019-10-02
Admitting: FAMILY MEDICINE

## 2019-10-02 DIAGNOSIS — Z12.31 VISIT FOR SCREENING MAMMOGRAM: ICD-10-CM

## 2019-10-02 PROCEDURE — 77063 BREAST TOMOSYNTHESIS BI: CPT | Performed by: RADIOLOGY

## 2019-10-02 PROCEDURE — 77067 SCR MAMMO BI INCL CAD: CPT | Performed by: RADIOLOGY

## 2019-10-02 PROCEDURE — 77063 BREAST TOMOSYNTHESIS BI: CPT

## 2019-10-02 PROCEDURE — 77067 SCR MAMMO BI INCL CAD: CPT

## 2019-10-04 ENCOUNTER — OFFICE VISIT (OUTPATIENT)
Dept: ONCOLOGY | Facility: CLINIC | Age: 59
End: 2019-10-04

## 2019-10-04 VITALS
SYSTOLIC BLOOD PRESSURE: 160 MMHG | BODY MASS INDEX: 32.6 KG/M2 | OXYGEN SATURATION: 100 % | HEIGHT: 63 IN | HEART RATE: 81 BPM | TEMPERATURE: 97.8 F | WEIGHT: 184 LBS | DIASTOLIC BLOOD PRESSURE: 76 MMHG | RESPIRATION RATE: 12 BRPM

## 2019-10-04 DIAGNOSIS — I26.99 RIGHT PULMONARY EMBOLUS (HCC): Primary | ICD-10-CM

## 2019-10-04 PROCEDURE — 99214 OFFICE O/P EST MOD 30 MIN: CPT | Performed by: INTERNAL MEDICINE

## 2019-10-04 NOTE — PROGRESS NOTES
DATE OF VISIT: 10/4/2019    REASON FOR VISIT: Followup for right pulmonary embolism       HISTORY OF PRESENT ILLNESS: The patient is a very pleasant 59 y.o. female who presents for follow-up on right pulmonary embolism.  She was in her usual state of health until January 2019.  The patient fell in her garage and hit her right side.  She had severe pain and edema in her right calf.  This lasted for approximately 1 month.  Early February 2019 patient presented with shortness of breath as well as pain in her right lower chest.  Patient was started on Xarelto.  CT chest PE protocol documented multiple blood clots in her right lung.  Venous Doppler lower extremities were negative.    SUBJECTIVE: ~Is here today by herself.  She stopped taking Xarelto 20 mg daily she had palpitation.  She was worried about risk of bleeding.  Copayment was $400.    PAST MEDICAL HISTORY/SOCIAL HISTORY/FAMILY HISTORY: Reviewed by me and unchanged.    Review of Systems   Constitutional: Negative for appetite change, fatigue, fever and unexpected weight change.   HENT: Negative for mouth sores, sore throat and trouble swallowing.    Respiratory: Negative for cough, shortness of breath and wheezing.    Cardiovascular: Negative for chest pain, palpitations and leg swelling.   Gastrointestinal: Negative for abdominal distention, abdominal pain, constipation, diarrhea, nausea and vomiting.   Genitourinary: Negative for difficulty urinating, dysuria and frequency.   Musculoskeletal: Negative for arthralgias.   Skin: Negative for pallor, rash and wound.   Neurological: Negative for dizziness and weakness.   Hematological: Does not bruise/bleed easily.   Psychiatric/Behavioral: Negative for confusion and sleep disturbance. The patient is not nervous/anxious.        Current Outpatient Medications:   •  escitalopram (LEXAPRO) 20 MG tablet, Take 20 mg by mouth Daily., Disp: , Rfl:   •  metoprolol tartrate (LOPRESSOR) 50 MG tablet, Take 50 mg by mouth 2  "(Two) Times a Day., Disp: , Rfl:   •  rivaroxaban (XARELTO) 20 MG tablet, Take 1 tablet by mouth Daily., Disp: 30 tablet, Rfl: 5  •  triamterene-hydrochlorothiazide (MAXZIDE) 75-50 MG per tablet, Take 1 tablet by mouth Daily., Disp: , Rfl:     PHYSICAL EXAMINATION:   /76   Pulse 81   Temp 97.8 °F (36.6 °C) (Temporal)   Resp 12   Ht 160 cm (62.99\")   Wt 83.5 kg (184 lb)   SpO2 100%   BMI 32.60 kg/m²    ECOG Performance Status: 0 - Asymptomatic  General Appearance:  alert, cooperative, no apparent distress and appears stated age   Neurologic/Psychiatric: A&O x 3, gait steady, appropriate affect, strength 5/5 in all muscle groups   HEENT:  Normocephalic, without obvious abnormality, mucous membranes moist   Neck: Supple, symmetrical, trachea midline, no adenopathy;  No thyromegaly, masses, or tenderness   Lungs:   Clear to auscultation bilaterally; respirations regular, even, and unlabored bilaterally   Heart:  Regular rate and rhythm, no murmurs appreciated   Abdomen:   Soft, nontender, nondistended   Lymph nodes: No cervical, supraclavicular, inguinal or axillary adenopathy noted   Extremities: Normal, atraumatic; no clubbing, cyanosis, or edema    Skin: No rashes, ulcers, or suspicious lesions noted     No visits with results within 2 Week(s) from this visit.   Latest known visit with results is:   Lab on 08/23/2019   Component Date Value Ref Range Status   • Factor V Leiden 08/23/2019 Normal  Normal Final   • AntiThromb III Func 08/23/2019 124  75 - 135 % Final    Direct Xa inhibitor anticoagulants such as rivaroxaban, apixaban and  edoxaban will lead to spuriously elevated antithrombin activity  levels possibly masking a deficiency.   • Protein C Antigen 08/23/2019 139  60 - 150 % Final   • Protein S Ag, Total 08/23/2019 106  60 - 150 % Final    This test was developed and its performance characteristics  determined by [facility]. It has not been cleared or approved  by the Food and Drug Administration. "   • Protein S Ag, Free 08/23/2019 41* 57 - 157 % Final    Comment: This test was developed and its performance characteristics  determined by [facility] It has not been cleared or approved  by the Food and Drug Administration.  A deficiency of free protein S antigen (FPS), either congenital or  acquired, increases the risk of thromboembolism. Acquired FPS  deficiency is more common than congenital deficiency. Acquired  deficiency can occur as a result of vitamin K deficiency or  antagonism, severe hepatic disorders (hepatitis, cirrhosis, etc.),  nephrotic syndrome, inflammatory bowel disease, certain  chemotherapeutic agents, L-asparaginse therapy, sepsis, disseminated  intravascular coagulation (DIC) and acute thrombosis. FPS values  decrease with normal pregnancy, and are also dependent on age, sex  and hormone status. FPS values tend to be lower in a younger age group  and lower in women than in men. Levels may be decreased in  pre-menopausal women on oral contraceptive agents. Levels may be  decreased in patients with polycythemia vera, sickle cell                            disease and  essential thombocythemia. Repeat evaluation on a new plasma sample  to confirm or refute this result should be considered, after ruling  out acquired causes, depending on the clinical scenario.   • Protein S-Functional 08/23/2019 49* 63 - 140 % Final    Comment: A deficiency of protein S (PS), either congenital or acquired,  increases the risk of thromboembolism. PS activity levels may be  falsely low in individuals with APCR/Factor V Leiden. Consider  performing free protein S antigen in those with APCR/Factor V Leiden  before making a diagnosis of protein S deficiency. Acquired PS  deficiency is more common than congenital deficiency. PS values  decrease with normal pregnancy, and are also dependent on age, sex  and hormone status. PS values tend to be lower in a younger age group  and lower in women than in men. Levels may be  decreased in  pre-menopausal women on oral contraceptive agents. Acquired deficiency  can occur as a result of vitamin K deficiency or antagonism, severe  hepatic disorders, (hepatitis, cirrhosis, etc.), nephrotic syndrome,  inflammatory bowel disease, certain chemotherapeutic agents,  L-asparaginse therapy, sepsis, disseminated intravascular coagulation  (DIC) and acute thrombosis. Levels may be decreased in patients                            with  polycythemia vera, sickle cell disease and essential thrombocythemia.  Repeat evaluation on a new plasma sample to confirm or refute this  result should be considered, after ruling out acquired causes,  depending on the clinical scenario.   • Prt C Activity (Chromogenic) 08/23/2019 129  % Final    Reference Range:  17 years and older: 73 - 180   • Dilute Prothrombin Time(dPT) 08/23/2019 39.9  0.0 - 55.0 sec Final   • dPT Confirm Ratio 08/23/2019 1.17  0.00 - 1.40 Ratio Final   • Thrombin Time 08/23/2019 18.1  0.0 - 23.0 sec Final   • PTT Lupus Anticoagulant 08/23/2019 32.5  0.0 - 51.9 sec Final   • Dilute Viper Venom Time 08/23/2019 37.4  0.0 - 47.0 sec Final   • Lupus Anticoagulant Reflex 08/23/2019 Comment:   Final    No lupus anticoagulant was detected.   • Beta-2 Glyco 1 IgG 08/23/2019 <9  0 - 20 GPI IgG units Final    The reference interval reflects a 3SD or 99th percentile interval,  which is thought to represent a potentially clinically significant  result in accordance with the International Consensus Statement on  the classification criteria for definitive antiphospholipid syndrome  (APS). J Thromb Haem 2006;4:295-306.   • Beta-2 Glyco 1 IgA 08/23/2019 <9  0 - 25 GPI IgA units Final    The reference interval reflects a 3SD or 99th percentile interval,  which is thought to represent a potentially clinically significant  result in accordance with the International Consensus Statement on  the classification criteria for definitive antiphospholipid syndrome  (APS).  J Thromb Haem 2006;4:295-306.   • Beta-2 Glyco 1 IgM 08/23/2019 <9  0 - 32 GPI IgM units Final    The reference interval reflects a 3SD or 99th percentile interval,  which is thought to represent a potentially clinically significant  result in accordance with the International Consensus Statement on  the classification criteria for definitive antiphospholipid syndrome  (APS). J Thromb Haem 2006;4:295-306.   • Anticardiolipin IgG 08/23/2019 <9  0 - 14 GPL U/mL Final                              Negative:              <15                            Indeterminate:     15 - 20                            Low-Med Positive: >20 - 80                            High Positive:         >80   • Anticardiolipin IgM 08/23/2019 <9  0 - 12 MPL U/mL Final                              Negative:              <13                            Indeterminate:     13 - 20                            Low-Med Positive: >20 - 80                            High Positive:         >80   • Factor II Activity 08/23/2019 129  50 - 154 % Final        Mammo Screening Digital Tomosynthesis Bilateral With Cad    Result Date: 10/3/2019  Narrative: ROUTINE SCREENING MAMMOGRAM WITH TOMOSYNTHESIS  HISTORY: 59-year-old female for routine screening  IMAGE COMPARISON:  Prior exams, most recently dated 5/22/2018  TECHNIQUE: Low dose full field digital breast tomosynthesis imaging was performed with 2D and 3D acquisitions consisting of bilateral CC and MLO views. Bilateral extended CC views were also performed.  FINDINGS: There are scattered areas of fibroglandular density. There is no worrisome mass, group of calcifications, or architectural distortion to suggest malignancy.      Impression: No findings suspicious for malignancy.  BI-RADS CATEGORY:  1, NEGATIVE  RECOMMENDATION: Yearly mammogram, yearly clinical breast exam, and encourage self breast awareness.  CAD was used.  The standard false negative rate of mammography is between 10% and 25%. Complex patterns  or increased breast density will markedly elevate the false negative rate of mammography.  A letter, in lay terminology, with the results of this exam will be mailed to the patient.  This report was finalized on 10/3/2019 9:57 AM by Dr. Jessica Machado MD.        ASSESSMENT: The patient is a very pleasant 58 y.o.  female  with right-sided PE     PROBLEM LIST:   1.  Right-sided pulmonary embolisms:  A.  Provoked by right lower extremity trauma January 2019  B.  Diagnosed February 12, 2019 after CT PE protocol  C.  On Xarelto since February 5, 2019, stopped on 8/9/2019.  D.  Thrombophilia work-up revealed protein S deficiency with both low level and function (40%)  2.  Hypertension  3.  Depression  4.  Obesity     PLAN:   1.  I explained to the patient that she will benefit from lifelong anticoagulation.  2.  The patient is not interested in taking Xarelto anymore.  3.  We will start patient on Eliquis 2.5 mg twice daily we will try to assist with copayments.  4.  The patient follow-up with me as scheduled August 2020.  5.  The patient was advised to exercise and lose weight.  6.  We will continue metoprolol and Maxzide for hypertension.  7.  We will continue Lexapro daily for depression.    Karena Stiles MD    10/4/2019

## 2019-12-06 ENCOUNTER — HOSPITAL ENCOUNTER (EMERGENCY)
Facility: HOSPITAL | Age: 59
Discharge: HOME | End: 2019-12-06
Attending: EMERGENCY MEDICINE | Admitting: EMERGENCY MEDICINE

## 2019-12-06 VITALS
HEART RATE: 62 BPM | WEIGHT: 188 LBS | OXYGEN SATURATION: 99 % | TEMPERATURE: 98.1 F | DIASTOLIC BLOOD PRESSURE: 94 MMHG | SYSTOLIC BLOOD PRESSURE: 165 MMHG | RESPIRATION RATE: 18 BRPM | HEIGHT: 65 IN | BODY MASS INDEX: 31.32 KG/M2

## 2019-12-06 DIAGNOSIS — R11.2 NAUSEA VOMITING AND DIARRHEA: Primary | ICD-10-CM

## 2019-12-06 DIAGNOSIS — R19.7 NAUSEA VOMITING AND DIARRHEA: Primary | ICD-10-CM

## 2019-12-06 DIAGNOSIS — K92.1 HEMATOCHEZIA: ICD-10-CM

## 2019-12-06 LAB
ABO GROUP BLD: NORMAL
ALBUMIN SERPL-MCNC: 3.6 G/DL (ref 3.5–5.2)
ALBUMIN/GLOB SERPL: 1.2 G/DL
ALP SERPL-CCNC: 76 U/L (ref 39–117)
ALT SERPL W P-5'-P-CCNC: 15 U/L (ref 1–33)
ANION GAP SERPL CALCULATED.3IONS-SCNC: 10 MMOL/L (ref 5–15)
AST SERPL-CCNC: 21 U/L (ref 1–32)
BACTERIA UR QL AUTO: ABNORMAL /HPF
BASOPHILS # BLD AUTO: 0.03 10*3/MM3 (ref 0–0.2)
BASOPHILS NFR BLD AUTO: 0.4 % (ref 0–1.5)
BILIRUB SERPL-MCNC: 0.5 MG/DL (ref 0.2–1.2)
BILIRUB UR QL STRIP: ABNORMAL
BLD GP AB SCN SERPL QL: NEGATIVE
BUN BLD-MCNC: 15 MG/DL (ref 6–20)
BUN/CREAT SERPL: 15.8 (ref 7–25)
CALCIUM SPEC-SCNC: 9.4 MG/DL (ref 8.6–10.5)
CHLORIDE SERPL-SCNC: 106 MMOL/L (ref 98–107)
CLARITY UR: ABNORMAL
CO2 SERPL-SCNC: 27 MMOL/L (ref 22–29)
COD CRY URNS QL: ABNORMAL /HPF
COLOR UR: ABNORMAL
CREAT BLD-MCNC: 0.95 MG/DL (ref 0.57–1)
DEPRECATED RDW RBC AUTO: 42.5 FL (ref 37–54)
DEVELOPER EXPIRATION DATE: ABNORMAL
DEVELOPER LOT NUMBER: ABNORMAL
EOSINOPHIL # BLD AUTO: 0.15 10*3/MM3 (ref 0–0.4)
EOSINOPHIL NFR BLD AUTO: 2 % (ref 0.3–6.2)
ERYTHROCYTE [DISTWIDTH] IN BLOOD BY AUTOMATED COUNT: 12.8 % (ref 12.3–15.4)
EXPIRATION DATE: ABNORMAL
FECAL OCCULT BLOOD SCREEN, POC: POSITIVE
GFR SERPL CREATININE-BSD FRML MDRD: 73 ML/MIN/1.73
GLOBULIN UR ELPH-MCNC: 2.9 GM/DL
GLUCOSE BLD-MCNC: 108 MG/DL (ref 65–99)
GLUCOSE UR STRIP-MCNC: NEGATIVE MG/DL
HCT VFR BLD AUTO: 39.5 % (ref 34–46.6)
HGB BLD-MCNC: 12.6 G/DL (ref 12–15.9)
HGB UR QL STRIP.AUTO: NEGATIVE
HOLD SPECIMEN: NORMAL
HOLD SPECIMEN: NORMAL
HYALINE CASTS UR QL AUTO: ABNORMAL /LPF
IMM GRANULOCYTES # BLD AUTO: 0.02 10*3/MM3 (ref 0–0.05)
IMM GRANULOCYTES NFR BLD AUTO: 0.3 % (ref 0–0.5)
INR PPP: 0.98 (ref 0.85–1.16)
KETONES UR QL STRIP: ABNORMAL
LEUKOCYTE ESTERASE UR QL STRIP.AUTO: ABNORMAL
LYMPHOCYTES # BLD AUTO: 2.19 10*3/MM3 (ref 0.7–3.1)
LYMPHOCYTES NFR BLD AUTO: 28.8 % (ref 19.6–45.3)
Lab: ABNORMAL
MCH RBC QN AUTO: 28.8 PG (ref 26.6–33)
MCHC RBC AUTO-ENTMCNC: 31.9 G/DL (ref 31.5–35.7)
MCV RBC AUTO: 90.2 FL (ref 79–97)
MONOCYTES # BLD AUTO: 0.13 10*3/MM3 (ref 0.1–0.9)
MONOCYTES NFR BLD AUTO: 1.7 % (ref 5–12)
NEGATIVE CONTROL: NEGATIVE
NEUTROPHILS # BLD AUTO: 5.08 10*3/MM3 (ref 1.7–7)
NEUTROPHILS NFR BLD AUTO: 66.8 % (ref 42.7–76)
NITRITE UR QL STRIP: NEGATIVE
NRBC BLD AUTO-RTO: 0 /100 WBC (ref 0–0.2)
PH UR STRIP.AUTO: 5.5 [PH] (ref 5–8)
PLATELET # BLD AUTO: 279 10*3/MM3 (ref 140–450)
PMV BLD AUTO: 10.6 FL (ref 6–12)
POSITIVE CONTROL: POSITIVE
POTASSIUM BLD-SCNC: 4.3 MMOL/L (ref 3.5–5.2)
PROT SERPL-MCNC: 6.5 G/DL (ref 6–8.5)
PROT UR QL STRIP: ABNORMAL
PROTHROMBIN TIME: 12.5 SECONDS (ref 11.2–14.3)
RBC # BLD AUTO: 4.38 10*6/MM3 (ref 3.77–5.28)
RBC # UR: ABNORMAL /HPF
REF LAB TEST METHOD: ABNORMAL
RH BLD: POSITIVE
SODIUM BLD-SCNC: 143 MMOL/L (ref 136–145)
SP GR UR STRIP: >=1.03 (ref 1–1.03)
SQUAMOUS #/AREA URNS HPF: ABNORMAL /HPF
T&S EXPIRATION DATE: NORMAL
UROBILINOGEN UR QL STRIP: ABNORMAL
WBC NRBC COR # BLD: 7.6 10*3/MM3 (ref 3.4–10.8)
WBC UR QL AUTO: ABNORMAL /HPF
WHOLE BLOOD HOLD SPECIMEN: NORMAL
WHOLE BLOOD HOLD SPECIMEN: NORMAL

## 2019-12-06 PROCEDURE — 99283 EMERGENCY DEPT VISIT LOW MDM: CPT

## 2019-12-06 PROCEDURE — 86850 RBC ANTIBODY SCREEN: CPT | Performed by: EMERGENCY MEDICINE

## 2019-12-06 PROCEDURE — 96375 TX/PRO/DX INJ NEW DRUG ADDON: CPT

## 2019-12-06 PROCEDURE — 25010000002 HYDROMORPHONE PER 4 MG: Performed by: EMERGENCY MEDICINE

## 2019-12-06 PROCEDURE — 86900 BLOOD TYPING SEROLOGIC ABO: CPT | Performed by: EMERGENCY MEDICINE

## 2019-12-06 PROCEDURE — 81001 URINALYSIS AUTO W/SCOPE: CPT | Performed by: EMERGENCY MEDICINE

## 2019-12-06 PROCEDURE — 25010000002 ONDANSETRON PER 1 MG: Performed by: EMERGENCY MEDICINE

## 2019-12-06 PROCEDURE — 85610 PROTHROMBIN TIME: CPT | Performed by: EMERGENCY MEDICINE

## 2019-12-06 PROCEDURE — 86901 BLOOD TYPING SEROLOGIC RH(D): CPT | Performed by: EMERGENCY MEDICINE

## 2019-12-06 PROCEDURE — 82270 OCCULT BLOOD FECES: CPT | Performed by: EMERGENCY MEDICINE

## 2019-12-06 PROCEDURE — 85025 COMPLETE CBC W/AUTO DIFF WBC: CPT | Performed by: EMERGENCY MEDICINE

## 2019-12-06 PROCEDURE — 99284 EMERGENCY DEPT VISIT MOD MDM: CPT

## 2019-12-06 PROCEDURE — 80053 COMPREHEN METABOLIC PANEL: CPT | Performed by: EMERGENCY MEDICINE

## 2019-12-06 PROCEDURE — 96374 THER/PROPH/DIAG INJ IV PUSH: CPT

## 2019-12-06 RX ORDER — SODIUM CHLORIDE 0.9 % (FLUSH) 0.9 %
10 SYRINGE (ML) INJECTION AS NEEDED
Status: DISCONTINUED | OUTPATIENT
Start: 2019-12-06 | End: 2019-12-06 | Stop reason: HOSPADM

## 2019-12-06 RX ORDER — ONDANSETRON 4 MG/1
4 TABLET, FILM COATED ORAL EVERY 8 HOURS PRN
Qty: 15 TABLET | Refills: 0 | OUTPATIENT
Start: 2019-12-06 | End: 2021-08-24

## 2019-12-06 RX ORDER — HYDROMORPHONE HYDROCHLORIDE 1 MG/ML
0.5 INJECTION, SOLUTION INTRAMUSCULAR; INTRAVENOUS; SUBCUTANEOUS ONCE
Status: COMPLETED | OUTPATIENT
Start: 2019-12-06 | End: 2019-12-06

## 2019-12-06 RX ORDER — DICYCLOMINE HCL 20 MG
20 TABLET ORAL EVERY 8 HOURS PRN
Qty: 20 TABLET | Refills: 0 | Status: SHIPPED | OUTPATIENT
Start: 2019-12-06 | End: 2022-03-01

## 2019-12-06 RX ORDER — ONDANSETRON 2 MG/ML
4 INJECTION INTRAMUSCULAR; INTRAVENOUS ONCE
Status: COMPLETED | OUTPATIENT
Start: 2019-12-06 | End: 2019-12-06

## 2019-12-06 RX ORDER — FUROSEMIDE 40 MG/1
40 TABLET ORAL 2 TIMES DAILY
COMMUNITY
End: 2021-08-24

## 2019-12-06 RX ORDER — ASPIRIN 81 MG/1
81 TABLET ORAL DAILY
COMMUNITY
End: 2021-08-24

## 2019-12-06 RX ORDER — CLOPIDOGREL BISULFATE 75 MG/1
75 TABLET ORAL DAILY
COMMUNITY
End: 2021-08-24

## 2019-12-06 RX ADMIN — ONDANSETRON 4 MG: 2 INJECTION INTRAMUSCULAR; INTRAVENOUS at 03:03

## 2019-12-06 RX ADMIN — HYDROMORPHONE HYDROCHLORIDE 0.5 MG: 1 INJECTION, SOLUTION INTRAMUSCULAR; INTRAVENOUS; SUBCUTANEOUS at 03:04

## 2019-12-06 RX ADMIN — SODIUM CHLORIDE, POTASSIUM CHLORIDE, SODIUM LACTATE AND CALCIUM CHLORIDE 1000 ML: 600; 310; 30; 20 INJECTION, SOLUTION INTRAVENOUS at 03:03

## 2019-12-06 NOTE — ED PROVIDER NOTES
"Subjective    59-year-old female presents for evaluation of nausea, vomiting, abdominal cramps, and \"bloody stool.\"  The patient states that she had some pizza for dinner last night and believes that she may have \"got food poisoning.\"  No other sick contacts.  No recent foreign travel.  She states that a few hours after eating the pizza she began experiencing abdominal cramps followed by loose, watery stools and nausea/vomiting.  She states that she noted some blood in her stool after several loose bowel movements, prompting her visit to the emergency department.  She is not anticoagulated.  No recent foreign travel.  She denies any active abdominal pain at this time.        History provided by:  Patient  Rectal Bleeding   Quality:  Bright red  Amount:  Moderate  Duration:  2 hours  Timing:  Constant  Chronicity:  New  Context: diarrhea    Similar prior episodes: no    Relieved by:  None tried  Worsened by:  Nothing  Ineffective treatments:  None tried  Associated symptoms: abdominal pain and vomiting    Risk factors: anticoagulant use        Review of Systems   Gastrointestinal: Positive for abdominal pain, blood in stool, diarrhea, hematochezia, nausea and vomiting.   All other systems reviewed and are negative.      Past Medical History:   Diagnosis Date   • Diabetes mellitus (CMS/HCC)     Prediabetes   • Hypertension        Allergies   Allergen Reactions   • Cephalexin Itching       Past Surgical History:   Procedure Laterality Date   • HYSTERECTOMY     • OOPHORECTOMY         Family History   Problem Relation Age of Onset   • Breast cancer Neg Hx    • Ovarian cancer Neg Hx    • BRCA 1/2 Neg Hx        Social History     Socioeconomic History   • Marital status:      Spouse name: Not on file   • Number of children: Not on file   • Years of education: Not on file   • Highest education level: Not on file   Tobacco Use   • Smoking status: Former Smoker   • Smokeless tobacco: Never Used   • Tobacco comment: " "Quit 21 years ago   Substance and Sexual Activity   • Alcohol use: No   • Drug use: No   • Sexual activity: Defer         Objective   Physical Exam   Constitutional: She is oriented to person, place, and time. She appears well-developed and well-nourished. No distress.   Well-appearing female in no acute distress   HENT:   Head: Normocephalic and atraumatic.   Mouth/Throat: Oropharynx is clear and moist.   Cardiovascular: Normal rate, regular rhythm and normal heart sounds. Exam reveals no gallop and no friction rub.   No murmur heard.  Pulmonary/Chest: Effort normal and breath sounds normal. No respiratory distress. She has no wheezes. She has no rales.   Abdominal: Soft. Bowel sounds are normal. She exhibits no distension and no mass. There is no tenderness. There is no rebound and no guarding.   No focal abdominal tenderness, no peritoneal signs, no pain out of proportion to exam   Genitourinary:   Genitourinary Comments: Unremarkable rectal exam, no anal fissure, no hemorrhoids, no bright red blood per rectum   Musculoskeletal: Normal range of motion.   Neurological: She is alert and oriented to person, place, and time.   Skin: Skin is warm and dry. No rash noted. She is not diaphoretic. No erythema.   Psychiatric: She has a normal mood and affect. Judgment and thought content normal.   Nursing note and vitals reviewed.      Procedures         ED Course  ED Course as of Dec 06 0547   Fri Dec 06, 2019   0520 59-year-old female presents for evaluation of abdominal cramps, nausea, vomiting, and diarrhea for the past few hours.  She attributes her symptoms to \"food poisoning\" from some bad pizza.  On arrival to the ED, patient well-appearing.  Nonsurgical abdomen.  Unremarkable rectal exam.  Labs unrevealing.  Fecal occult blood test was trace positive.  [DD]   0521 The patient has no abdominal tenderness on exam whatsoever.  [DD]   0545 On reevaluation, patient much improved.  She is tolerating p.o.  Doubt emergent " process at this time.  Reassured and counseled regarding symptomatic management.  Scripts for Bentyl and Zofran as needed.  She will follow-up with her primary care physician within the next week.  Agreeable with plan and given appropriate strict return precautions.  [DD]      ED Course User Index  [DD] Meet Carlisle MD     Recent Results (from the past 24 hour(s))   Urinalysis With Microscopic If Indicated (No Culture) - Urine, Clean Catch    Collection Time: 12/06/19  2:39 AM   Result Value Ref Range    Color, UA Dark Yellow (A) Yellow, Straw    Appearance, UA Cloudy (A) Clear    pH, UA 5.5 5.0 - 8.0    Specific Gravity, UA >=1.030 1.001 - 1.030    Glucose, UA Negative Negative    Ketones, UA Trace (A) Negative    Bilirubin, UA Small (1+) (A) Negative    Blood, UA Negative Negative    Protein, UA 30 mg/dL (1+) (A) Negative    Leuk Esterase, UA Small (1+) (A) Negative    Nitrite, UA Negative Negative    Urobilinogen, UA 1.0 E.U./dL 0.2 - 1.0 E.U./dL   Urinalysis, Microscopic Only - Urine, Clean Catch    Collection Time: 12/06/19  2:39 AM   Result Value Ref Range    RBC, UA 0-2 None Seen, 0-2 /HPF    WBC, UA 6-12 (A) None Seen, 0-2 /HPF    Bacteria, UA None Seen None Seen, Trace /HPF    Squamous Epithelial Cells, UA 7-12 (A) None Seen, 0-2 /HPF    Hyaline Casts, UA 0-6 0 - 6 /LPF    Calcium Oxalate Crystals, UA Small/1+ None Seen /HPF    Methodology Manual Light Microscopy    CBC Auto Differential    Collection Time: 12/06/19  2:49 AM   Result Value Ref Range    WBC 7.60 3.40 - 10.80 10*3/mm3    RBC 4.38 3.77 - 5.28 10*6/mm3    Hemoglobin 12.6 12.0 - 15.9 g/dL    Hematocrit 39.5 34.0 - 46.6 %    MCV 90.2 79.0 - 97.0 fL    MCH 28.8 26.6 - 33.0 pg    MCHC 31.9 31.5 - 35.7 g/dL    RDW 12.8 12.3 - 15.4 %    RDW-SD 42.5 37.0 - 54.0 fl    MPV 10.6 6.0 - 12.0 fL    Platelets 279 140 - 450 10*3/mm3    Neutrophil % 66.8 42.7 - 76.0 %    Lymphocyte % 28.8 19.6 - 45.3 %    Monocyte % 1.7 (L) 5.0 - 12.0 %    Eosinophil %  "2.0 0.3 - 6.2 %    Basophil % 0.4 0.0 - 1.5 %    Immature Grans % 0.3 0.0 - 0.5 %    Neutrophils, Absolute 5.08 1.70 - 7.00 10*3/mm3    Lymphocytes, Absolute 2.19 0.70 - 3.10 10*3/mm3    Monocytes, Absolute 0.13 0.10 - 0.90 10*3/mm3    Eosinophils, Absolute 0.15 0.00 - 0.40 10*3/mm3    Basophils, Absolute 0.03 0.00 - 0.20 10*3/mm3    Immature Grans, Absolute 0.02 0.00 - 0.05 10*3/mm3    nRBC 0.0 0.0 - 0.2 /100 WBC   Protime-INR    Collection Time: 12/06/19  2:49 AM   Result Value Ref Range    Protime 12.5 11.2 - 14.3 Seconds    INR 0.98 0.85 - 1.16   POC Occult Blood Stool    Collection Time: 12/06/19  2:53 AM   Result Value Ref Range    Fecal Occult Blood Positive (A) Negative    Lot Number [id number]      Expiration Date 1-22     DEVELOPER LOT NUMBER [id number]     DEVELOPER EXPIRATION DATE 2,022-10     Positive Control Positive Positive    Negative Control Negative Negative     Note: In addition to lab results from this visit, the labs listed above may include labs taken at another facility or during a different encounter within the last 24 hours. Please correlate lab times with ED admission and discharge times for further clarification of the services performed during this visit.    No orders to display     Vitals:    12/06/19 0225 12/06/19 0230 12/06/19 0231 12/06/19 0232   BP: (!) 190/93 173/76     BP Location: Left arm      Patient Position: Sitting      Pulse: 71   80   Resp: 22      Temp: 98.1 °F (36.7 °C)      TempSrc: Oral      SpO2: 100% 99% 100% 100%   Weight: 85.3 kg (188 lb)      Height: 165.1 cm (65\")        Medications   sodium chloride 0.9 % flush 10 mL (not administered)   lactated ringers bolus 1,000 mL (1,000 mL Intravenous New Bag 12/6/19 0303)   HYDROmorphone (DILAUDID) injection 0.5 mg (0.5 mg Intravenous Given 12/6/19 0304)   ondansetron (ZOFRAN) injection 4 mg (4 mg Intravenous Given 12/6/19 0303)     ECG/EMG Results (last 24 hours)     ** No results found for the last 24 hours. **        No " orders to display                   Recent Results (from the past 24 hour(s))   Urinalysis With Microscopic If Indicated (No Culture) - Urine, Clean Catch    Collection Time: 12/06/19  2:39 AM   Result Value Ref Range    Color, UA Dark Yellow (A) Yellow, Straw    Appearance, UA Cloudy (A) Clear    pH, UA 5.5 5.0 - 8.0    Specific Gravity, UA >=1.030 1.001 - 1.030    Glucose, UA Negative Negative    Ketones, UA Trace (A) Negative    Bilirubin, UA Small (1+) (A) Negative    Blood, UA Negative Negative    Protein, UA 30 mg/dL (1+) (A) Negative    Leuk Esterase, UA Small (1+) (A) Negative    Nitrite, UA Negative Negative    Urobilinogen, UA 1.0 E.U./dL 0.2 - 1.0 E.U./dL   Urinalysis, Microscopic Only - Urine, Clean Catch    Collection Time: 12/06/19  2:39 AM   Result Value Ref Range    RBC, UA 0-2 None Seen, 0-2 /HPF    WBC, UA 6-12 (A) None Seen, 0-2 /HPF    Bacteria, UA None Seen None Seen, Trace /HPF    Squamous Epithelial Cells, UA 7-12 (A) None Seen, 0-2 /HPF    Hyaline Casts, UA 0-6 0 - 6 /LPF    Calcium Oxalate Crystals, UA Small/1+ None Seen /HPF    Methodology Manual Light Microscopy    Light Blue Top    Collection Time: 12/06/19  2:49 AM   Result Value Ref Range    Extra Tube hold for add-on    Lavender Top    Collection Time: 12/06/19  2:49 AM   Result Value Ref Range    Extra Tube hold for add-on    CBC Auto Differential    Collection Time: 12/06/19  2:49 AM   Result Value Ref Range    WBC 7.60 3.40 - 10.80 10*3/mm3    RBC 4.38 3.77 - 5.28 10*6/mm3    Hemoglobin 12.6 12.0 - 15.9 g/dL    Hematocrit 39.5 34.0 - 46.6 %    MCV 90.2 79.0 - 97.0 fL    MCH 28.8 26.6 - 33.0 pg    MCHC 31.9 31.5 - 35.7 g/dL    RDW 12.8 12.3 - 15.4 %    RDW-SD 42.5 37.0 - 54.0 fl    MPV 10.6 6.0 - 12.0 fL    Platelets 279 140 - 450 10*3/mm3    Neutrophil % 66.8 42.7 - 76.0 %    Lymphocyte % 28.8 19.6 - 45.3 %    Monocyte % 1.7 (L) 5.0 - 12.0 %    Eosinophil % 2.0 0.3 - 6.2 %    Basophil % 0.4 0.0 - 1.5 %    Immature Grans % 0.3 0.0 -  0.5 %    Neutrophils, Absolute 5.08 1.70 - 7.00 10*3/mm3    Lymphocytes, Absolute 2.19 0.70 - 3.10 10*3/mm3    Monocytes, Absolute 0.13 0.10 - 0.90 10*3/mm3    Eosinophils, Absolute 0.15 0.00 - 0.40 10*3/mm3    Basophils, Absolute 0.03 0.00 - 0.20 10*3/mm3    Immature Grans, Absolute 0.02 0.00 - 0.05 10*3/mm3    nRBC 0.0 0.0 - 0.2 /100 WBC   Protime-INR    Collection Time: 12/06/19  2:49 AM   Result Value Ref Range    Protime 12.5 11.2 - 14.3 Seconds    INR 0.98 0.85 - 1.16   POC Occult Blood Stool    Collection Time: 12/06/19  2:53 AM   Result Value Ref Range    Fecal Occult Blood Positive (A) Negative    Lot Number [id number]     Expiration Date 1-22     DEVELOPER LOT NUMBER [id number]     DEVELOPER EXPIRATION DATE 2,022-10     Positive Control Positive Positive    Negative Control Negative Negative   Comprehensive Metabolic Panel    Collection Time: 12/06/19  4:14 AM   Result Value Ref Range    Glucose 108 (H) 65 - 99 mg/dL    BUN 15 6 - 20 mg/dL    Creatinine 0.95 0.57 - 1.00 mg/dL    Sodium 143 136 - 145 mmol/L    Potassium 4.3 3.5 - 5.2 mmol/L    Chloride 106 98 - 107 mmol/L    CO2 27.0 22.0 - 29.0 mmol/L    Calcium 9.4 8.6 - 10.5 mg/dL    Total Protein 6.5 6.0 - 8.5 g/dL    Albumin 3.60 3.50 - 5.20 g/dL    ALT (SGPT) 15 1 - 33 U/L    AST (SGOT) 21 1 - 32 U/L    Alkaline Phosphatase 76 39 - 117 U/L    Total Bilirubin 0.5 0.2 - 1.2 mg/dL    eGFR  African Amer 73 >60 mL/min/1.73    Globulin 2.9 gm/dL    A/G Ratio 1.2 g/dL    BUN/Creatinine Ratio 15.8 7.0 - 25.0    Anion Gap 10.0 5.0 - 15.0 mmol/L   Green Top (Gel)    Collection Time: 12/06/19  4:14 AM   Result Value Ref Range    Extra Tube Hold for add-ons.    Gold Top - SST    Collection Time: 12/06/19  4:14 AM   Result Value Ref Range    Extra Tube Hold for add-ons.      Note: In addition to lab results from this visit, the labs listed above may include labs taken at another facility or during a different encounter within the last 24 hours. Please correlate lab  times with ED admission and discharge times for further clarification of the services performed during this visit.    No orders to display     Vitals:    12/06/19 0231 12/06/19 0232 12/06/19 0330 12/06/19 0430   BP:   159/86 165/84   BP Location:       Patient Position:       Pulse:  80 70 62   Resp:    18   Temp:       TempSrc:       SpO2: 100% 100% 97% 98%   Weight:       Height:         Medications   sodium chloride 0.9 % flush 10 mL (not administered)   lactated ringers bolus 1,000 mL (0 mL Intravenous Stopped 12/6/19 0540)   HYDROmorphone (DILAUDID) injection 0.5 mg (0.5 mg Intravenous Given 12/6/19 0304)   ondansetron (ZOFRAN) injection 4 mg (4 mg Intravenous Given 12/6/19 0303)     ECG/EMG Results (last 24 hours)     ** No results found for the last 24 hours. **        No orders to display           MDM    Final diagnoses:   Nausea vomiting and diarrhea   Hematochezia       Documentation assistance provided by bailee Ho.  Information recorded by the scrarnele was done at my direction and has been verified and validated by me.         Susan Ho  12/06/19 0323       Meet Carlisle MD  12/06/19 [id number]

## 2020-01-24 ENCOUNTER — HOSPITAL ENCOUNTER (OUTPATIENT)
Dept: CT IMAGING | Facility: HOSPITAL | Age: 60
Discharge: HOME | End: 2020-01-24
Admitting: FAMILY MEDICINE

## 2020-01-24 ENCOUNTER — TRANSCRIBE ORDERS (OUTPATIENT)
Dept: ADMINISTRATIVE | Facility: HOSPITAL | Age: 60
End: 2020-01-24

## 2020-01-24 DIAGNOSIS — I26.99 MULTIPLE PULMONARY EMBOLI (HCC): Primary | ICD-10-CM

## 2020-01-24 DIAGNOSIS — I26.99 MULTIPLE PULMONARY EMBOLI (HCC): ICD-10-CM

## 2020-01-24 PROCEDURE — 71275 CT ANGIOGRAPHY CHEST: CPT

## 2020-01-24 PROCEDURE — 0 IOPAMIDOL PER 1 ML: Performed by: FAMILY MEDICINE

## 2020-01-24 PROCEDURE — 82565 ASSAY OF CREATININE: CPT

## 2020-01-24 RX ADMIN — IOPAMIDOL 95 ML: 755 INJECTION, SOLUTION INTRAVENOUS at 16:46

## 2020-01-28 LAB — CREAT BLDA-MCNC: 0.9 MG/DL (ref 0.6–1.3)

## 2020-09-08 ENCOUNTER — TRANSCRIBE ORDERS (OUTPATIENT)
Dept: ADMINISTRATIVE | Facility: HOSPITAL | Age: 60
End: 2020-09-08

## 2020-09-08 ENCOUNTER — HOSPITAL ENCOUNTER (OUTPATIENT)
Dept: GENERAL RADIOLOGY | Facility: HOSPITAL | Age: 60
Discharge: HOME | End: 2020-09-08
Admitting: FAMILY MEDICINE

## 2020-09-08 DIAGNOSIS — R93.89 ABNORMAL CT OF THE CHEST: ICD-10-CM

## 2020-09-08 DIAGNOSIS — R93.89 ABNORMAL CT OF THE CHEST: Primary | ICD-10-CM

## 2020-09-08 PROCEDURE — 71046 X-RAY EXAM CHEST 2 VIEWS: CPT

## 2020-12-11 ENCOUNTER — TELEPHONE (OUTPATIENT)
Dept: ONCOLOGY | Facility: CLINIC | Age: 60
End: 2020-12-11

## 2020-12-11 ENCOUNTER — OFFICE VISIT (OUTPATIENT)
Dept: ONCOLOGY | Facility: CLINIC | Age: 60
End: 2020-12-11

## 2020-12-11 VITALS
HEART RATE: 81 BPM | HEIGHT: 63 IN | DIASTOLIC BLOOD PRESSURE: 89 MMHG | BODY MASS INDEX: 35.44 KG/M2 | RESPIRATION RATE: 16 BRPM | TEMPERATURE: 96.2 F | WEIGHT: 200 LBS | SYSTOLIC BLOOD PRESSURE: 164 MMHG | OXYGEN SATURATION: 99 %

## 2020-12-11 DIAGNOSIS — I26.99 RIGHT PULMONARY EMBOLUS (HCC): Primary | ICD-10-CM

## 2020-12-11 PROCEDURE — 99214 OFFICE O/P EST MOD 30 MIN: CPT | Performed by: INTERNAL MEDICINE

## 2020-12-11 NOTE — PROGRESS NOTES
DATE OF VISIT: 12/11/2020    REASON FOR VISIT: Followup for right pulmonary embolism       HISTORY OF PRESENT ILLNESS: The patient is a very pleasant 60 y.o. female who presents for follow-up on right pulmonary embolism.  She was in her usual state of health until January 2019.  The patient fell in her garage and hit her right side.  She had severe pain and edema in her right calf.  This lasted for approximately 1 month.  Early February 2019 patient presented with shortness of breath as well as pain in her right lower chest.  Patient was started on Xarelto.  CT chest PE protocol documented multiple blood clots in her right lung.  Venous Doppler lower extremities were negative.    SUBJECTIVE: The patient is here today by herself.  She has been compliant with her treatment although she can to have financial difficulties with Eliquis is cost of more than $40 every month.  She has been counting on some samples support from Dr. Patel's office.  Denies any bleeding.    PAST MEDICAL HISTORY/SOCIAL HISTORY/FAMILY HISTORY: Reviewed by me and unchanged.    Review of Systems   Constitutional: Negative for appetite change, fatigue, fever and unexpected weight change.   HENT: Negative for mouth sores, sore throat and trouble swallowing.    Respiratory: Negative for cough, shortness of breath and wheezing.    Cardiovascular: Negative for chest pain, palpitations and leg swelling.   Gastrointestinal: Negative for abdominal distention, abdominal pain, constipation, diarrhea, nausea and vomiting.   Genitourinary: Negative for difficulty urinating, dysuria and frequency.   Musculoskeletal: Negative for arthralgias.   Skin: Negative for pallor, rash and wound.   Neurological: Negative for dizziness and weakness.   Hematological: Does not bruise/bleed easily.   Psychiatric/Behavioral: Negative for confusion and sleep disturbance. The patient is not nervous/anxious.        Current Outpatient Medications:   •  aspirin 81 MG EC tablet,  Take 81 mg by mouth Daily., Disp: , Rfl:   •  clopidogrel (PLAVIX) 75 MG tablet, Take 75 mg by mouth Daily., Disp: , Rfl:   •  dicyclomine (BENTYL) 20 MG tablet, Take 1 tablet by mouth Every 8 (Eight) Hours As Needed (pain, cramps)., Disp: 20 tablet, Rfl: 0  •  furosemide (LASIX) 40 MG tablet, Take 40 mg by mouth 2 (Two) Times a Day., Disp: , Rfl:   •  metoprolol tartrate (LOPRESSOR) 50 MG tablet, Take 50 mg by mouth 2 (Two) Times a Day., Disp: , Rfl:   •  ondansetron (ZOFRAN) 4 MG tablet, Take 1 tablet by mouth Every 8 (Eight) Hours As Needed for Nausea., Disp: 15 tablet, Rfl: 0  •  PAROXETINE HCL PO, Take  by mouth., Disp: , Rfl:     PHYSICAL EXAMINATION:   There were no vitals taken for this visit.   ECOG Performance Status: 1 - Symptomatic but completely ambulatory  General Appearance:  alert, cooperative, no apparent distress and appears stated age   Neurologic/Psychiatric: A&O x 3, gait steady, appropriate affect, strength 5/5 in all muscle groups   HEENT:  Normocephalic, without obvious abnormality, mucous membranes moist   Neck: Supple, symmetrical, trachea midline, no adenopathy;  No thyromegaly, masses, or tenderness   Lungs:   Clear to auscultation bilaterally; respirations regular, even, and unlabored bilaterally   Heart:  Regular rate and rhythm, no murmurs appreciated   Abdomen:   Soft, nontender, nondistended   Lymph nodes: No cervical, supraclavicular, inguinal or axillary adenopathy noted   Extremities: Normal, atraumatic; no clubbing, cyanosis, or edema    Skin: No rashes, ulcers, or suspicious lesions noted     No visits with results within 2 Week(s) from this visit.   Latest known visit with results is:   Hospital Outpatient Visit on 01/24/2020   Component Date Value Ref Range Status   • Creatinine 01/24/2020 0.90  0.60 - 1.30 mg/dL Final    Serial Number: [id number]:  [id number]        No results found.    ASSESSMENT: The patient is a very pleasant 58 y.o.  female  with right-sided  PE     PROBLEM LIST:   1.  Right-sided pulmonary embolisms:  A.  Provoked by right lower extremity trauma January 2019  B.  Diagnosed February 12, 2019 after CT PE protocol  C.  On Xarelto since February 5, 2019, stopped on 8/9/2019.  D.  Thrombophilia work-up revealed protein S deficiency with both low level and function (40%)  2.  Hypertension  3.  Depression  4.  Obesity     PLAN:   1.  I we will continue the patient on Eliquis 2.5 mg twice daily we will try to assist with copayments.  2.  The patient follow-up with me in 1 year December 2021.  3.  The patient was advised to exercise and lose weight.  4.  We will continue metoprolol and Maxzide for hypertension.  5. We will continue Lexapro daily for depression.    Karena Stiles MD    12/11/2020

## 2020-12-11 NOTE — TELEPHONE ENCOUNTER
Caller: Susannah    Relationship: Pt    Best call back number: [phone]    What form or medical record are you requesting: Work excuse for today's appt 12/11    How would you like to receive the form or medical records (pick-up, mail, fax): Mail   If mail, what is the address: [address] [place] [id number]    Timeframe paperwork needed: Asap

## 2020-12-18 ENCOUNTER — TELEPHONE (OUTPATIENT)
Dept: ONCOLOGY | Facility: CLINIC | Age: 60
End: 2020-12-18

## 2020-12-18 NOTE — TELEPHONE ENCOUNTER
----- Message from Frida Mota RN sent at 12/17/2020  9:04 AM EST -----  Regarding: FW: eliquis  Can you fax the form in Dr. Anabella lane once he has signed it and let patient know when it has been faxed?    Thanks so much! Will you also make sure this message trail goes in the chart as an encounter when you finish?    Frida  ----- Message -----  From: Frida Mota RN  Sent: 12/17/2020  To: Frida Mota RN  Subject: FW: eliquis                                        ----- Message -----  From: Frida Mota RN  Sent: 12/16/2020  10:42 AM EST  To: Frida Mota RN, Xi Ferraro MA  Subject: RE: eliquis                                      I called patient - she has spoke with Pat and she also said that she has a form for us to fill out to help her out with the cost.  Advised for her to fax to office for us to fill out and fax in for her.  Xi if you see this come in can you put it in my box and I can work on tomorrow?    Thanks,    Frida  ----- Message -----  From: Kamila Mackey PharmD  Sent: 12/16/2020  10:31 AM EST  To: Frida Mota RN  Subject: FW: eliquis                                        ----- Message -----  From: Pat Hahn  Sent: 12/16/2020  10:24 AM EST  To: Kamila Mackey PharmD  Subject: RE: eliquis                                      Patient has to re enroll for the next year with the copay card. She is aware of this as well. She has already used the copay card for 2020. Patient has to call to get the copay card as they will not speak with anyone to apply but patient  ----- Message -----  From: Kamila Mackey PharmD  Sent: 12/16/2020  10:21 AM EST  To: Pat Hahn  Subject: FW: eliquis                                      Did you already take care of this Eliquis patient?  ----- Message -----  From: Frida Mota RN  Sent: 12/16/2020   9:34 AM EST  To: Kamila Mackey, PharmD  Subject: FW: eliquis                                      Girl- I remember  sending a message about this last week to someone to see if any help - Did I send it to you?  I sent myself a reminder to f/u on it today and I can't remember what I did.....artisvincent  ----- Message -----  From: Frida Mota RN  Sent: 12/16/2020  To: Frida Mota RN  Subject: FW: eliquis                                        ----- Message -----  From: Frida Mota RN  Sent: 12/11/2020   9:51 AM EST  To: Frida Mota RN  Subject: eliquis                                          Per Dr. Stiles patient is having a high copay on eliquis 2.5.  He advised if it is cheaper to send in 5mg so that she can cut it in half that we could send that.  Will reach out to pharmacy to check on cost.  And if cheaper to do 5mg will also send that in.

## 2021-03-25 ENCOUNTER — TELEPHONE (OUTPATIENT)
Dept: ONCOLOGY | Facility: CLINIC | Age: 61
End: 2021-03-25

## 2021-03-25 NOTE — TELEPHONE ENCOUNTER
Pt has appt for COVID-19 vaccine.  She is on blood thinners.  She needs to speak to someone to find out if this is a problem with her getting the vaccine.    [phone]

## 2021-04-20 ENCOUNTER — TELEPHONE (OUTPATIENT)
Dept: ONCOLOGY | Facility: CLINIC | Age: 61
End: 2021-04-20

## 2021-04-20 NOTE — TELEPHONE ENCOUNTER
Caller: PEDRO PRASAD    Relationship to patient: SELF    Best call back number: [phone]    PT IS CALLING BACK IN REGARD TO HER ELIQUIS ASSISTANCE. PT WOULD LIKE [facility] TO CALL HER AS SOON AS POSSIBLE TO MAKE SURE THE OFFICE RECEIVES THE PT'S PAPERWORK. PT WANTS TO MAKE SURE THIS IS TAKEN CARE OF SWIFTLY. THANK YOU

## 2021-04-20 NOTE — TELEPHONE ENCOUNTER
Caller: PT    Relationship: PT    Best call back number: [phone]    What form or medical record are you requesting:APPLICATION FOR ASSISTANCE FOR [facility]    Who is requesting this form or medical record from you: [facility]. PT TO FAX OVER TODAY.    How would you like to receive the form or medical records (pick-up, mail, fax):FAX  If fax, what is the fax number: [phone]  If mail, what is the address:   If pick-up, provide patient with address and location details

## 2021-07-21 NOTE — TELEPHONE ENCOUNTER
PATIENT CALLED BACK IN REGARDING THIS MATTER, HASN'T HEARD ANYTHING IN A COUPLE OF MONTHS AND WOULD LIKE TO KNOW WHERE SHE IS ON THE ELIQUIS. PLEASE CALL BACK [phone].

## 2021-08-24 ENCOUNTER — HOSPITAL ENCOUNTER (EMERGENCY)
Facility: HOSPITAL | Age: 61
Discharge: HOME | End: 2021-08-24
Attending: EMERGENCY MEDICINE | Admitting: EMERGENCY MEDICINE

## 2021-08-24 VITALS
HEIGHT: 65 IN | SYSTOLIC BLOOD PRESSURE: 188 MMHG | OXYGEN SATURATION: 100 % | DIASTOLIC BLOOD PRESSURE: 98 MMHG | BODY MASS INDEX: 31.99 KG/M2 | RESPIRATION RATE: 16 BRPM | WEIGHT: 192 LBS | HEART RATE: 80 BPM | TEMPERATURE: 98 F

## 2021-08-24 DIAGNOSIS — R03.0 ELEVATED BLOOD PRESSURE READING: ICD-10-CM

## 2021-08-24 DIAGNOSIS — R20.2 PARESTHESIA: Primary | ICD-10-CM

## 2021-08-24 PROCEDURE — 96374 THER/PROPH/DIAG INJ IV PUSH: CPT

## 2021-08-24 PROCEDURE — 99282 EMERGENCY DEPT VISIT SF MDM: CPT

## 2021-08-24 RX ORDER — TORSEMIDE 10 MG/1
10 TABLET ORAL DAILY
COMMUNITY
End: 2022-03-01

## 2021-08-24 RX ORDER — HYDROXYZINE PAMOATE 50 MG/1
50 CAPSULE ORAL 3 TIMES DAILY PRN
Qty: 25 CAPSULE | Refills: 0 | Status: SHIPPED | OUTPATIENT
Start: 2021-08-24 | End: 2022-03-01

## 2021-08-24 RX ORDER — HYDRALAZINE HYDROCHLORIDE 10 MG/1
10 TABLET, FILM COATED ORAL EVERY 6 HOURS PRN
COMMUNITY
End: 2022-03-01

## 2021-08-24 RX ORDER — LABETALOL HYDROCHLORIDE 5 MG/ML
10 INJECTION, SOLUTION INTRAVENOUS ONCE
Status: COMPLETED | OUTPATIENT
Start: 2021-08-24 | End: 2021-08-24

## 2021-08-24 RX ADMIN — LABETALOL 20 MG/4 ML (5 MG/ML) INTRAVENOUS SYRINGE 10 MG: at 19:21

## 2021-08-24 NOTE — ED PROVIDER NOTES
EMERGENCY DEPARTMENT ENCOUNTER    Pt Name: Susannah Cam  MRN: [id number]  Pt :   1960  Room Number:    Date of encounter:  2021  PCP: Meet Patel MD  ED Provider: Lio Bryant MD    Historian: Patient      HPI:  Chief Complaint: Paresthesias, hypertension        Context: Susannah Cam is a 61 y.o. female who presents to the ED c/o tingling sensations in her head and face starting earlier today.  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  She felt the symptoms and then checked her blood pressure finding it to be 167/107.  She has as needed hydralazine 10 mg tablets and had taken 1 earlier in the day.  She repeated this roughly an hour after symptoms started.  She found her blood pressure to be 186/105.  She was instructed to come to the emergency department for further evaluation.  Her usual blood pressure runs between the 130s and 140 systolic and high 70s to low 80s diastolic.  She avoids stimulants with the exception of 1 cup of coffee per day.  The patient takes her diuretic sporadically as she does not like the way it makes her feel.  Because of her elevated blood pressure she did take a dose roughly 4 hours prior to coming to the emergency department.  The patient has protein S deficiency and takes Eliquis as directed.  The patient received Covid vaccination in April.        PAST MEDICAL HISTORY  Past Medical History:   Diagnosis Date   • Diabetes mellitus (CMS/HCC)     Prediabetes   • Hypertension          PAST SURGICAL HISTORY  Past Surgical History:   Procedure Laterality Date   • HYSTERECTOMY     • OOPHORECTOMY           FAMILY HISTORY  Family History   Problem Relation Age of Onset   • Breast cancer Neg Hx    • Ovarian cancer Neg Hx    • BRCA 1/2 Neg Hx          SOCIAL HISTORY  Social History     Socioeconomic History   • Marital status:      Spouse  name: Not on file   • Number of children: Not on file   • Years of education: Not on file   • Highest education level: Not on file   Tobacco Use   • Smoking status: Former Smoker   • Smokeless tobacco: Never Used   • Tobacco comment: Quit 21 years ago   Substance and Sexual Activity   • Alcohol use: No   • Drug use: No   • Sexual activity: Defer         ALLERGIES  Cephalexin        REVIEW OF SYSTEMS  Review of Systems       All systems reviewed and negative except for those discussed in HPI.       PHYSICAL EXAM    I have reviewed the triage vital signs and nursing notes.    ED Triage Vitals [08/24/21 1744]   Temp Heart Rate Resp BP SpO2   98 °F (36.7 °C) 82 16 (!) 222/128 100 %      Temp src Heart Rate Source Patient Position BP Location FiO2 (%)   Oral Monitor Sitting Left arm --       Physical Exam  GENERAL:   Appears very pleasant, no acute distress.  HENT: Nares patent.  No thyromegaly  EYES: No scleral icterus.  CV: Regular rhythm, regular rate.  No murmurs gallops rubs  RESPIRATORY: Normal effort.  No audible wheezes, rales or rhonchi.  Clear to auscultation  ABDOMEN: Soft, nontender  MUSCULOSKELETAL: No deformities.   NEURO: Alert, moves all extremities, follows commands.  SKIN: Warm, dry, no rash visualized.        LAB RESULTS  No results found for this or any previous visit (from the past 24 hour(s)).    If labs were ordered, I independently reviewed the results.        RADIOLOGY  No Radiology Exams Resulted Within Past 24 Hours        PROCEDURES    Procedures    No orders to display       MEDICATIONS GIVEN IN ER    Medications   labetalol (NORMODYNE,TRANDATE) injection 10 mg (10 mg Intravenous Given 8/24/21 1921)         PROGRESS, DATA ANALYSIS, CONSULTS, AND MEDICAL DECISION MAKING    All labs have been independently reviewed by me.  All radiology studies have been reviewed by me and the radiologist dictating the report.   EKG's have been independently viewed and interpreted by me.      Differential  diagnoses: Elevated blood pressure and paresthesias with stress/anxiety.  Likely the stress has led to elevated blood pressure which is led to paresthesia but other more serious etiologies are considered as well.                 AS OF 21:05 EDT VITALS:    BP - (!) 188/98  HR - 80  TEMP - 98 °F (36.7 °C) (Oral)  O2 SATS - 100%        DIAGNOSIS  Final diagnoses:   Paresthesia   Elevated blood pressure reading         DISPOSITION  DISCHARGE    FOLLOW-UP  Meet Patel MD  [address] [address]  [phone]      AS ALREADY SCHEDULED this week    [facility] Emergency Department  [address] [id number] [phone]    IF YOU HAVE ANY CONCERN OF WORSENING CONDITION         Medication List      New Prescriptions    hydrOXYzine pamoate 50 MG capsule  Commonly known as: VISTARIL  Take 1 capsule by mouth 3 (Three) Times a Day As Needed for Anxiety.        Stop    aspirin 81 MG EC tablet     clopidogrel 75 MG tablet  Commonly known as: PLAVIX     furosemide 40 MG tablet  Commonly known as: LASIX     ondansetron 4 MG tablet  Commonly known as: ZOFRAN           Where to Get Your Medications      These medications were sent to [facility] [address] [place] [address] - [address] [place] [address] [address] [place] - [phone]  - [phone] [address] [address]    Phone: [phone]   · hydrOXYzine pamoate 50 MG capsule                  Lio Bryant MD  08/25/21 9463

## 2021-08-24 NOTE — DISCHARGE INSTRUCTIONS
Take hydroxyzine if not driving or operating heavy machinery if you are feeling very anxious.    Please check your blood pressure 3 times a day. Keep a chart of these readings and any correlation to symptoms you might be having and bring this chart to the follow up with your primary care physician. If you don't already have a good blood pressure cuff, I recommend the following:    [employer] - WIDIP Blood Pressure Monitor (for upper arm)    or similar upper arm blood pressure cuffs which can be purchased for around $25.

## 2021-09-02 ENCOUNTER — TELEPHONE (OUTPATIENT)
Dept: ONCOLOGY | Facility: CLINIC | Age: 61
End: 2021-09-02

## 2021-09-02 ENCOUNTER — EDUCATION (OUTPATIENT)
Dept: ONCOLOGY | Facility: HOSPITAL | Age: 61
End: 2021-09-02

## 2021-09-02 NOTE — TELEPHONE ENCOUNTER
Caller: Susannah Cam    Relationship: Self    Best call back number: [phone]    What is the best time to reach you: ANYTIME    Who are you requesting to speak with (clinical staff, provider,  specific staff member): NURSE    What was the call regarding: PATIENT CALLED STATING THAT SHE HAS BEEN TAKING ELIQUIS FOR QUITE SOME TIME NOW AND HAS RECENTLY BEEN EXPERIENCING INTERMITTENT NUMBNESS IN HER LEGS X 3 DAYS.  SHE REVIEWED SIDE EFFECTS OF ELIQUIS AND THIS WAS ONE THAT INDICATED THAT SHE SHOULD CONTACT OUR OFFICE IMMEDIATELY.      Do you require a callback: YES

## 2021-09-02 NOTE — PLAN OF CARE
Discussed with patient over the phone about her inquiry regarding eliqius. Pt wanted to know if eliquis can cause the intermittent numbness in her legs that she has experienced the last few days. Advised Pt that eliquis hasn't been known to cause this; however, we also discussed signs/symptoms of a VTE and also the importance of checking her blood pressure often since her last ED visit. Pt did not have any additional questions/concerns at this time.

## 2021-12-03 ENCOUNTER — TELEPHONE (OUTPATIENT)
Dept: ONCOLOGY | Facility: CLINIC | Age: 61
End: 2021-12-03

## 2021-12-03 NOTE — TELEPHONE ENCOUNTER
Caller: Susannah Cam    Relationship to patient: Self    Best call back number: [phone] CAN CALL BACK ANYTIME AND MAY LEAVE A VM.    Type of visit: FOLLOW UP APPT.    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  PATIENT MENTIONED THAT SHE HAS MISSED A LOT OF WORK SO FAR DUE TO APPTS AND WORK SCHEDULE.  PATIENT WOULD LIKE TO CHANGE APPT TO 04:00PM IF POSSIBLE DUE TO NOT BEING ABLE TO MAKE IT IN TIME FOR ORIGINAL APPT.  IF NOT POSSIBLE CAN PATIENT DO A MYCHART VIA Z[facility] FOLLOW UP APPT.      [facility]

## 2021-12-08 ENCOUNTER — TELEPHONE (OUTPATIENT)
Dept: ONCOLOGY | Facility: CLINIC | Age: 61
End: 2021-12-08

## 2021-12-08 NOTE — TELEPHONE ENCOUNTER
Caller: Susannah Cam    Relationship to patient: Self    Best call back number: [phone]    Chief complaint:PT CANCELLED 2/10 VIRTUAL APPT BY MISTAKE    Type of visit: FU    Requested date: 2/10 IF AVAILABLE OR NEXT AVAILABLE

## 2022-02-28 ENCOUNTER — HOSPITAL ENCOUNTER (EMERGENCY)
Facility: HOSPITAL | Age: 62
Discharge: HOME | End: 2022-03-01
Attending: EMERGENCY MEDICINE | Admitting: EMERGENCY MEDICINE

## 2022-02-28 ENCOUNTER — APPOINTMENT (OUTPATIENT)
Dept: GENERAL RADIOLOGY | Facility: HOSPITAL | Age: 62
End: 2022-02-28

## 2022-02-28 VITALS
DIASTOLIC BLOOD PRESSURE: 78 MMHG | OXYGEN SATURATION: 100 % | HEIGHT: 65 IN | SYSTOLIC BLOOD PRESSURE: 146 MMHG | WEIGHT: 191 LBS | TEMPERATURE: 97.8 F | RESPIRATION RATE: 18 BRPM | BODY MASS INDEX: 31.82 KG/M2 | HEART RATE: 70 BPM

## 2022-02-28 DIAGNOSIS — R07.9 NONSPECIFIC CHEST PAIN: Primary | ICD-10-CM

## 2022-02-28 DIAGNOSIS — R03.0 ELEVATED BLOOD PRESSURE READING: ICD-10-CM

## 2022-02-28 LAB
ALBUMIN SERPL-MCNC: 4.4 G/DL (ref 3.5–5.2)
ALBUMIN/GLOB SERPL: 1.3 G/DL
ALP SERPL-CCNC: 110 U/L (ref 39–117)
ALT SERPL W P-5'-P-CCNC: 16 U/L (ref 1–33)
ANION GAP SERPL CALCULATED.3IONS-SCNC: 12 MMOL/L (ref 5–15)
AST SERPL-CCNC: 19 U/L (ref 1–32)
BASOPHILS # BLD AUTO: 0.03 10*3/MM3 (ref 0–0.2)
BASOPHILS NFR BLD AUTO: 0.3 % (ref 0–1.5)
BILIRUB SERPL-MCNC: 0.7 MG/DL (ref 0–1.2)
BUN SERPL-MCNC: 14 MG/DL (ref 8–23)
BUN/CREAT SERPL: 12.6 (ref 7–25)
CALCIUM SPEC-SCNC: 9.8 MG/DL (ref 8.6–10.5)
CHLORIDE SERPL-SCNC: 97 MMOL/L (ref 98–107)
CO2 SERPL-SCNC: 27 MMOL/L (ref 22–29)
CREAT SERPL-MCNC: 1.11 MG/DL (ref 0.57–1)
DEPRECATED RDW RBC AUTO: 40.1 FL (ref 37–54)
EGFRCR SERPLBLD CKD-EPI 2021: 56.7 ML/MIN/1.73
EOSINOPHIL # BLD AUTO: 0.09 10*3/MM3 (ref 0–0.4)
EOSINOPHIL NFR BLD AUTO: 1 % (ref 0.3–6.2)
ERYTHROCYTE [DISTWIDTH] IN BLOOD BY AUTOMATED COUNT: 12.5 % (ref 12.3–15.4)
GLOBULIN UR ELPH-MCNC: 3.4 GM/DL
GLUCOSE SERPL-MCNC: 129 MG/DL (ref 65–99)
HCT VFR BLD AUTO: 40.5 % (ref 34–46.6)
HGB BLD-MCNC: 13.5 G/DL (ref 12–15.9)
HOLD SPECIMEN: NORMAL
HOLD SPECIMEN: NORMAL
IMM GRANULOCYTES # BLD AUTO: 0.03 10*3/MM3 (ref 0–0.05)
IMM GRANULOCYTES NFR BLD AUTO: 0.3 % (ref 0–0.5)
LIPASE SERPL-CCNC: 50 U/L (ref 13–60)
LYMPHOCYTES # BLD AUTO: 4.65 10*3/MM3 (ref 0.7–3.1)
LYMPHOCYTES NFR BLD AUTO: 49.1 % (ref 19.6–45.3)
MCH RBC QN AUTO: 29.2 PG (ref 26.6–33)
MCHC RBC AUTO-ENTMCNC: 33.3 G/DL (ref 31.5–35.7)
MCV RBC AUTO: 87.7 FL (ref 79–97)
MONOCYTES # BLD AUTO: 0.46 10*3/MM3 (ref 0.1–0.9)
MONOCYTES NFR BLD AUTO: 4.9 % (ref 5–12)
NEUTROPHILS NFR BLD AUTO: 4.21 10*3/MM3 (ref 1.7–7)
NEUTROPHILS NFR BLD AUTO: 44.4 % (ref 42.7–76)
NRBC BLD AUTO-RTO: 0 /100 WBC (ref 0–0.2)
NT-PROBNP SERPL-MCNC: 47 PG/ML (ref 0–900)
PLAT MORPH BLD: NORMAL
PLATELET # BLD AUTO: 309 10*3/MM3 (ref 140–450)
PMV BLD AUTO: 10 FL (ref 6–12)
POTASSIUM SERPL-SCNC: 3.9 MMOL/L (ref 3.5–5.2)
PROT SERPL-MCNC: 7.8 G/DL (ref 6–8.5)
RBC # BLD AUTO: 4.62 10*6/MM3 (ref 3.77–5.28)
RBC MORPH BLD: NORMAL
SODIUM SERPL-SCNC: 136 MMOL/L (ref 136–145)
TROPONIN T SERPL-MCNC: <0.01 NG/ML (ref 0–0.03)
WBC MORPH BLD: NORMAL
WBC NRBC COR # BLD: 9.47 10*3/MM3 (ref 3.4–10.8)
WHOLE BLOOD HOLD SPECIMEN: NORMAL
WHOLE BLOOD HOLD SPECIMEN: NORMAL

## 2022-02-28 PROCEDURE — 93005 ELECTROCARDIOGRAM TRACING: CPT

## 2022-02-28 PROCEDURE — 93005 ELECTROCARDIOGRAM TRACING: CPT | Performed by: EMERGENCY MEDICINE

## 2022-02-28 PROCEDURE — 83690 ASSAY OF LIPASE: CPT

## 2022-02-28 PROCEDURE — 36415 COLL VENOUS BLD VENIPUNCTURE: CPT

## 2022-02-28 PROCEDURE — 99283 EMERGENCY DEPT VISIT LOW MDM: CPT

## 2022-02-28 PROCEDURE — 85007 BL SMEAR W/DIFF WBC COUNT: CPT

## 2022-02-28 PROCEDURE — 85025 COMPLETE CBC W/AUTO DIFF WBC: CPT

## 2022-02-28 PROCEDURE — 80053 COMPREHEN METABOLIC PANEL: CPT

## 2022-02-28 PROCEDURE — 83880 ASSAY OF NATRIURETIC PEPTIDE: CPT

## 2022-02-28 PROCEDURE — 71045 X-RAY EXAM CHEST 1 VIEW: CPT

## 2022-02-28 PROCEDURE — 84484 ASSAY OF TROPONIN QUANT: CPT

## 2022-02-28 RX ORDER — SODIUM CHLORIDE 0.9 % (FLUSH) 0.9 %
10 SYRINGE (ML) INJECTION AS NEEDED
Status: DISCONTINUED | OUTPATIENT
Start: 2022-02-28 | End: 2022-03-01 | Stop reason: HOSPADM

## 2022-02-28 RX ORDER — ASPIRIN 81 MG/1
324 TABLET, CHEWABLE ORAL ONCE
Status: COMPLETED | OUTPATIENT
Start: 2022-02-28 | End: 2022-03-01

## 2022-03-01 LAB
HOLD SPECIMEN: NORMAL
QT INTERVAL: 398 MS
QTC INTERVAL: 429 MS
TROPONIN T SERPL-MCNC: <0.01 NG/ML (ref 0–0.03)

## 2022-03-01 PROCEDURE — 84484 ASSAY OF TROPONIN QUANT: CPT | Performed by: EMERGENCY MEDICINE

## 2022-03-01 PROCEDURE — 93005 ELECTROCARDIOGRAM TRACING: CPT | Performed by: EMERGENCY MEDICINE

## 2022-03-01 RX ORDER — NIFEDIPINE 10 MG/1
10 CAPSULE ORAL 3 TIMES DAILY
COMMUNITY

## 2022-03-01 RX ADMIN — ASPIRIN 81 MG CHEWABLE TABLET 324 MG: 81 TABLET CHEWABLE at 00:35

## 2022-03-01 NOTE — DISCHARGE INSTRUCTIONS
Try relaxation techniques.  Keep a chart of your blood pressures taking the at least 3 times a day.  Do not become overly concerned if the blood pressure reading is high and you are not having symptoms.    Please review the medications you are supposed to be taking according to prior physician recommendations. I have not changed your home medications during this visit. If your discharge instructions indicate that I have changed your home medications, this is not the case, and you should disregard. If you have any questions about the medication you should be taking at home, please call your physician.       If you have any concern of worsening condition please return to the emergency department immediately.

## 2022-03-01 NOTE — ED PROVIDER NOTES
" EMERGENCY DEPARTMENT ENCOUNTER    Pt Name: Susannah Cam  MRN: [id number]  Pt :   1960  Room Number:    Date of encounter:  2022  PCP: Meet Patel MD  ED Provider: Lio Bryant MD    Historian: Patient      HPI:  Chief Complaint: Elevated blood pressure reading and chest discomfort        Context: Susannah Cam is a 61 y.o. female who presents to the ED c/o chest discomfort and a general feeling of anxiety after checking her blood pressure this evening.  She had not checked it in several days and found it to be 189 systolic and then on repeat she remembers it being 180/117.  She took her metoprolol and her nifedipine as prescribed by her PCP.  She was anxious and came to the emergency department for further evaluation.  At no point did she developed a tearing or ripping sensation.  Her discomfort is in her midsternal region and is easily reproduced if she pushes on her chest.  She denies fevers, chills, diaphoresis.  She reports history of hypertension and \"prediabetes\".  No family history of heart disease.  Non-smoker.        PAST MEDICAL HISTORY  Past Medical History:   Diagnosis Date   • Diabetes mellitus (CMS/HCC)     Prediabetes   • Hypertension          PAST SURGICAL HISTORY  Past Surgical History:   Procedure Laterality Date   • HYSTERECTOMY     • OOPHORECTOMY           FAMILY HISTORY  Family History   Problem Relation Age of Onset   • Breast cancer Neg Hx    • Ovarian cancer Neg Hx    • BRCA 1/2 Neg Hx          SOCIAL HISTORY  Social History     Socioeconomic History   • Marital status:    Tobacco Use   • Smoking status: Former Smoker   • Smokeless tobacco: Never Used   • Tobacco comment: Quit 21 years ago   Substance and Sexual Activity   • Alcohol use: No   • Drug use: No   • Sexual activity: Defer         ALLERGIES  Cephalexin        REVIEW OF SYSTEMS  Review of Systems       All systems reviewed and negative except for those discussed in HPI.       PHYSICAL " EXAM    I have reviewed the triage vital signs and nursing notes.    ED Triage Vitals [02/28/22 2152]   Temp Heart Rate Resp BP SpO2   97.8 °F (36.6 °C) 75 18 (!) 200/97 100 %      Temp src Heart Rate Source Patient Position BP Location FiO2 (%)   Oral Monitor Sitting Left arm --       Physical Exam  GENERAL:   Appears mildly anxious but nontoxic  HENT: Nares patent.  EYES: No scleral icterus.  CV: Regular rhythm, regular rate.  No murmurs gallops rubs  RESPIRATORY: Normal effort.  No audible wheezes, rales or rhonchi.  Clear to auscultation  ABDOMEN: Soft, nontender  MUSCULOSKELETAL: No deformities.  Very reproducible midsternal chest discomfort with palpation which exactly reproduces the patient's complaint.  NEURO: Alert, moves all extremities, follows commands.  SKIN: Warm, dry, no rash visualized.        LAB RESULTS  Recent Results (from the past 24 hour(s))   Troponin    Collection Time: 02/28/22 10:07 PM    Specimen: Blood   Result Value Ref Range    Troponin T <0.010 0.000 - 0.030 ng/mL   Comprehensive Metabolic Panel    Collection Time: 02/28/22 10:07 PM    Specimen: Blood   Result Value Ref Range    Glucose 129 (H) 65 - 99 mg/dL    BUN 14 8 - 23 mg/dL    Creatinine 1.11 (H) 0.57 - 1.00 mg/dL    Sodium 136 136 - 145 mmol/L    Potassium 3.9 3.5 - 5.2 mmol/L    Chloride 97 (L) 98 - 107 mmol/L    CO2 27.0 22.0 - 29.0 mmol/L    Calcium 9.8 8.6 - 10.5 mg/dL    Total Protein 7.8 6.0 - 8.5 g/dL    Albumin 4.40 3.50 - 5.20 g/dL    ALT (SGPT) 16 1 - 33 U/L    AST (SGOT) 19 1 - 32 U/L    Alkaline Phosphatase 110 39 - 117 U/L    Total Bilirubin 0.7 0.0 - 1.2 mg/dL    Globulin 3.4 gm/dL    A/G Ratio 1.3 g/dL    BUN/Creatinine Ratio 12.6 7.0 - 25.0    Anion Gap 12.0 5.0 - 15.0 mmol/L    eGFR 56.7 (L) >60.0 mL/min/1.73   Lipase    Collection Time: 02/28/22 10:07 PM    Specimen: Blood   Result Value Ref Range    Lipase 50 13 - 60 U/L   BNP    Collection Time: 02/28/22 10:07 PM    Specimen: Blood   Result Value Ref Range     proBNP 47.0 0.0 - 900.0 pg/mL   Green Top (Gel)    Collection Time: 02/28/22 10:07 PM   Result Value Ref Range    Extra Tube Hold for add-ons.    Lavender Top    Collection Time: 02/28/22 10:07 PM   Result Value Ref Range    Extra Tube hold for add-on    Gold Top - SST    Collection Time: 02/28/22 10:07 PM   Result Value Ref Range    Extra Tube Hold for add-ons.    Light Blue Top    Collection Time: 02/28/22 10:07 PM   Result Value Ref Range    Extra Tube hold for add-on    CBC Auto Differential    Collection Time: 02/28/22 10:07 PM    Specimen: Blood   Result Value Ref Range    WBC 9.47 3.40 - 10.80 10*3/mm3    RBC 4.62 3.77 - 5.28 10*6/mm3    Hemoglobin 13.5 12.0 - 15.9 g/dL    Hematocrit 40.5 34.0 - 46.6 %    MCV 87.7 79.0 - 97.0 fL    MCH 29.2 26.6 - 33.0 pg    MCHC 33.3 31.5 - 35.7 g/dL    RDW 12.5 12.3 - 15.4 %    RDW-SD 40.1 37.0 - 54.0 fl    MPV 10.0 6.0 - 12.0 fL    Platelets 309 140 - 450 10*3/mm3    Neutrophil % 44.4 42.7 - 76.0 %    Lymphocyte % 49.1 (H) 19.6 - 45.3 %    Monocyte % 4.9 (L) 5.0 - 12.0 %    Eosinophil % 1.0 0.3 - 6.2 %    Basophil % 0.3 0.0 - 1.5 %    Immature Grans % 0.3 0.0 - 0.5 %    Neutrophils, Absolute 4.21 1.70 - 7.00 10*3/mm3    Lymphocytes, Absolute 4.65 (H) 0.70 - 3.10 10*3/mm3    Monocytes, Absolute 0.46 0.10 - 0.90 10*3/mm3    Eosinophils, Absolute 0.09 0.00 - 0.40 10*3/mm3    Basophils, Absolute 0.03 0.00 - 0.20 10*3/mm3    Immature Grans, Absolute 0.03 0.00 - 0.05 10*3/mm3    nRBC 0.0 0.0 - 0.2 /100 WBC   Scan Slide    Collection Time: 02/28/22 10:07 PM    Specimen: Blood   Result Value Ref Range    RBC Morphology Normal Normal    WBC Morphology Normal Normal    Platelet Morphology Normal Normal   Troponin    Collection Time: 03/01/22 12:14 AM    Specimen: Blood   Result Value Ref Range    Troponin T <0.010 0.000 - 0.030 ng/mL   ECG 12 Lead    Collection Time: 03/01/22 12:21 AM   Result Value Ref Range    QT Interval 398 ms    QTC Interval 429 ms       If labs were  ordered, I independently reviewed the results.        RADIOLOGY  XR Chest 1 View    Result Date: 2/28/2022  EXAMINATION: XR CHEST 1 VW-  INDICATION: Chest Pain triage protocol  COMPARISON: 9/8/2020  FINDINGS: Heart mediastinum and pulmonary vasculature appear within normal limits. Lungs appear normally expanded and clear bilaterally. No pneumothorax or effusion is seen..       No evidence of active chest disease.    This report was finalized on 2/28/2022 10:53 PM by Dr. Edward Bang MD.        I ordered and reviewed the above noted radiographic studies.      I viewed images of chest x-ray which showed no active disease per my independent interpretation.    See radiologist's dictation for official interpretation.        PROCEDURES    Procedures    ECG 12 Lead   Final Result   Test Reason : chest pain   Blood Pressure :   */*   mmHG   Vent. Rate :  70 BPM     Atrial Rate :  70 BPM      P-R Int : 168 ms          QRS Dur :  78 ms       QT Int : 398 ms       P-R-T Axes :  60   2  26 degrees      QTc Int : 429 ms      Normal sinus rhythm   Normal ECG   When compared with ECG of 28-FEB-2022 22:05,   No significant change was found   Confirmed by DOMINIC DAMON MD ([id number]) on 3/1/2022 12:27:21 AM      Referred By: EDMD           Confirmed By: DOMINIC DAMON MD      ECG 12 Lead    (Results Pending)       MEDICATIONS GIVEN IN ER    Medications   sodium chloride 0.9 % flush 10 mL (has no administration in time range)   aspirin chewable tablet 324 mg (324 mg Oral Given 3/1/22 0035)         PROGRESS, DATA ANALYSIS, CONSULTS, AND MEDICAL DECISION MAKING    All labs have been independently reviewed by me.  All radiology studies have been reviewed by me and the radiologist dictating the report.   EKG's have been independently viewed and interpreted by me.      Differential diagnoses: Elevated blood pressure reading along with anxiety and some chest discomfort.  Of course I would consider acute coronary syndrome, PE, aortic dissection.   However uncontrolled hypertension along with anxiety is more likely.      ED Course as of 03/01/22 0051   Tue Mar 01, 2022   0004 Current blood pressure 126/58.  Patient is calm.  I spoke with her in detail about our findings and my recommendations.  She reminded me that I had seen her the last time she came and.  She is very appreciative of care and will follow up closely with her PCP. [MS]   0019 Current blood pressure 124/75. [MS]   0020 HEART Pathway for Early Discharge in Acute Chest Pain - MDCalc  Calculated on Mar 01 2022 12:20 AM  3 points -> HEART Pathway Score  Low risk -> 0.9-1.7% 30-day MACE Repeat troponin at 3 hours and if negative, discharge home with outpatient follow-up. [MS]      ED Course User Index  [MS] Lio Bryant MD             AS OF 00:51 EST VITALS:    BP - 146/78  HR - 70  TEMP - 97.8 °F (36.6 °C) (Oral)  O2 SATS - 100%                  DIAGNOSIS  Final diagnoses:   Nonspecific chest pain   Elevated blood pressure reading         DISPOSITION  DISCHARGE    Patient discharged in stable condition.    Reviewed implications of results, diagnosis, meds, responsibility to follow up, warning signs and symptoms of possible worsening, potential complications and reasons to return to ER.    Patient/Family voiced understanding of above instructions.    Discussed plan for discharge, as there is no emergent indication for admission.  Pt/family is agreeable and understands need for follow up and possible repeat testing.  Pt/family is aware that discharge does not mean that nothing is wrong but that it indicates no emergency is currently present that requires admission and they must continue care with follow-up as given below or with a physician of their choice.     FOLLOW-UP  Meet Patel MD  [address] [address]  [phone]      NEXT AVAILABLE APPOINTMENT - RECHECK OF CONDITION    [facility] Emergency Department  [address] [facility]  [place] [address] [phone]    IF YOU HAVE ANY CONCERN OF WORSENING CONDITION    [facility] CARDIOLOGY  [address] [facility]  [address]  [facility] [id number] [phone]             Medication List      No changes were made to your prescriptions during this visit.                  Lio Bryant MD  03/01/22 0052

## 2022-03-04 LAB
QT INTERVAL: 436 MS
QTC INTERVAL: 477 MS

## 2022-05-02 ENCOUNTER — TRANSCRIBE ORDERS (OUTPATIENT)
Dept: ADMINISTRATIVE | Facility: HOSPITAL | Age: 62
End: 2022-05-02

## 2022-05-02 DIAGNOSIS — Z12.31 VISIT FOR SCREENING MAMMOGRAM: Primary | ICD-10-CM

## 2022-05-04 ENCOUNTER — TRANSCRIBE ORDERS (OUTPATIENT)
Dept: ADMINISTRATIVE | Facility: HOSPITAL | Age: 62
End: 2022-05-04

## 2022-05-04 DIAGNOSIS — R10.9 ABDOMINAL PAIN IN FEMALE: Primary | ICD-10-CM

## 2022-05-10 ENCOUNTER — APPOINTMENT (OUTPATIENT)
Dept: MAMMOGRAPHY | Facility: HOSPITAL | Age: 62
End: 2022-05-10

## 2023-05-02 PROCEDURE — 87205 SMEAR GRAM STAIN: CPT | Performed by: NURSE PRACTITIONER

## 2023-05-02 PROCEDURE — 87070 CULTURE OTHR SPECIMN AEROBIC: CPT | Performed by: NURSE PRACTITIONER

## 2024-01-04 PROCEDURE — 87205 SMEAR GRAM STAIN: CPT | Performed by: NURSE PRACTITIONER

## 2024-01-04 PROCEDURE — 87070 CULTURE OTHR SPECIMN AEROBIC: CPT | Performed by: NURSE PRACTITIONER

## 2024-04-01 ENCOUNTER — TRANSCRIBE ORDERS (OUTPATIENT)
Dept: ADMINISTRATIVE | Facility: HOSPITAL | Age: 64
End: 2024-04-01
Payer: COMMERCIAL

## 2024-04-01 ENCOUNTER — HOSPITAL ENCOUNTER (OUTPATIENT)
Dept: CARDIOLOGY | Facility: HOSPITAL | Age: 64
Discharge: HOME | End: 2024-04-01
Admitting: FAMILY MEDICINE
Payer: COMMERCIAL

## 2024-04-01 VITALS — BODY MASS INDEX: 31.77 KG/M2 | WEIGHT: 186.07 LBS | HEIGHT: 64 IN

## 2024-04-01 DIAGNOSIS — M79.671 FOOT PAIN, RIGHT: ICD-10-CM

## 2024-04-01 DIAGNOSIS — M79.671 FOOT PAIN, RIGHT: Primary | ICD-10-CM

## 2024-04-01 LAB
BH CV LOW VAS RIGHT GREATER SAPH BK VESSEL: 1
BH CV LOWER VASCULAR LEFT COMMON FEMORAL AUGMENT: NORMAL
BH CV LOWER VASCULAR LEFT COMMON FEMORAL COMPRESS: NORMAL
BH CV LOWER VASCULAR LEFT COMMON FEMORAL PHASIC: NORMAL
BH CV LOWER VASCULAR LEFT COMMON FEMORAL SPONT: NORMAL
BH CV LOWER VASCULAR RIGHT COMMON FEMORAL AUGMENT: NORMAL
BH CV LOWER VASCULAR RIGHT COMMON FEMORAL COMPRESS: NORMAL
BH CV LOWER VASCULAR RIGHT COMMON FEMORAL PHASIC: NORMAL
BH CV LOWER VASCULAR RIGHT COMMON FEMORAL SPONT: NORMAL
BH CV LOWER VASCULAR RIGHT DISTAL FEMORAL AUGMENT: NORMAL
BH CV LOWER VASCULAR RIGHT DISTAL FEMORAL COMPRESS: NORMAL
BH CV LOWER VASCULAR RIGHT GASTRONEMIUS COMPRESS: NORMAL
BH CV LOWER VASCULAR RIGHT GREATER SAPH AK COMPRESS: NORMAL
BH CV LOWER VASCULAR RIGHT GREATER SAPH BK COMPRESS: NORMAL
BH CV LOWER VASCULAR RIGHT LESSER SAPH COMPRESS: NORMAL
BH CV LOWER VASCULAR RIGHT MID FEMORAL AUGMENT: NORMAL
BH CV LOWER VASCULAR RIGHT MID FEMORAL COMPRESS: NORMAL
BH CV LOWER VASCULAR RIGHT MID FEMORAL PHASIC: NORMAL
BH CV LOWER VASCULAR RIGHT MID FEMORAL SPONT: NORMAL
BH CV LOWER VASCULAR RIGHT PERONEAL AUGMENT: NORMAL
BH CV LOWER VASCULAR RIGHT PERONEAL COMPRESS: NORMAL
BH CV LOWER VASCULAR RIGHT POPLITEAL AUGMENT: NORMAL
BH CV LOWER VASCULAR RIGHT POPLITEAL COMPRESS: NORMAL
BH CV LOWER VASCULAR RIGHT POPLITEAL PHASIC: NORMAL
BH CV LOWER VASCULAR RIGHT POPLITEAL SPONT: NORMAL
BH CV LOWER VASCULAR RIGHT POSTERIOR TIBIAL AUGMENT: NORMAL
BH CV LOWER VASCULAR RIGHT POSTERIOR TIBIAL COMPRESS: NORMAL
BH CV LOWER VASCULAR RIGHT PROFUNDA FEMORAL COMPRESS: NORMAL
BH CV LOWER VASCULAR RIGHT PROFUNDA FEMORAL PHASIC: NORMAL
BH CV LOWER VASCULAR RIGHT PROFUNDA FEMORAL SPONT: NORMAL
BH CV LOWER VASCULAR RIGHT PROXIMAL FEMORAL COMPRESS: NORMAL
BH CV LOWER VASCULAR RIGHT SAPHENOFEMORAL JUNCTION COMPETENT: NORMAL
BH CV LOWER VASCULAR RIGHT SAPHENOFEMORAL JUNCTION COMPRESS: NORMAL
BH CV VAS PRELIMINARY FINDINGS SCRIPTING: 1

## 2024-04-01 PROCEDURE — 93971 EXTREMITY STUDY: CPT | Performed by: INTERNAL MEDICINE

## 2024-04-01 PROCEDURE — 93971 EXTREMITY STUDY: CPT

## 2024-07-29 PROCEDURE — 87636 SARSCOV2 & INF A&B AMP PRB: CPT | Performed by: NURSE PRACTITIONER

## 2024-07-30 ENCOUNTER — PATIENT ROUNDING (BHMG ONLY) (OUTPATIENT)
Dept: URGENT CARE | Facility: CLINIC | Age: 64
End: 2024-07-30

## 2024-11-06 ENCOUNTER — PATIENT ROUNDING (BHMG ONLY) (OUTPATIENT)
Dept: URGENT CARE | Facility: CLINIC | Age: 64
End: 2024-11-06
Payer: COMMERCIAL

## 2024-12-12 ENCOUNTER — TELEPHONE (OUTPATIENT)
Dept: FAMILY MEDICINE CLINIC | Facility: CLINIC | Age: 64
End: 2024-12-12

## 2024-12-12 NOTE — TELEPHONE ENCOUNTER
Caller: Susannah Cam    Relationship to patient: Self    Best call back number: [phone]     Chief complaint: PATIENT HAS HIGH BLOOD PRESSURE AND A BLOOD CLOT DISORDER    Type of visit: NEW PATIENT    Additional notes:PATIENT SAYS SHE HAS FAMILY WHO GOES TO DR ALEJANDRO AND SHE WOULD LIKE TO BECOME A PATIENT OF [facility]ERS. PLEASE CALL AND ADVISE IF ABLE TO SCHEDULE OR GIVE ALTERNATIVES.

## 2025-04-27 ENCOUNTER — APPOINTMENT (OUTPATIENT)
Dept: CT IMAGING | Facility: HOSPITAL | Age: 65
End: 2025-04-27
Payer: COMMERCIAL

## 2025-04-27 ENCOUNTER — HOSPITAL ENCOUNTER (EMERGENCY)
Facility: HOSPITAL | Age: 65
Discharge: HOME | End: 2025-04-27
Attending: EMERGENCY MEDICINE | Admitting: EMERGENCY MEDICINE
Payer: COMMERCIAL

## 2025-04-27 ENCOUNTER — APPOINTMENT (OUTPATIENT)
Dept: GENERAL RADIOLOGY | Facility: HOSPITAL | Age: 65
End: 2025-04-27
Payer: COMMERCIAL

## 2025-04-27 VITALS
HEIGHT: 64 IN | BODY MASS INDEX: 32.1 KG/M2 | SYSTOLIC BLOOD PRESSURE: 185 MMHG | OXYGEN SATURATION: 100 % | RESPIRATION RATE: 18 BRPM | WEIGHT: 188 LBS | HEART RATE: 70 BPM | DIASTOLIC BLOOD PRESSURE: 96 MMHG | TEMPERATURE: 98.8 F

## 2025-04-27 DIAGNOSIS — I16.0 HYPERTENSIVE URGENCY: Primary | ICD-10-CM

## 2025-04-27 DIAGNOSIS — I10 HYPERTENSION, UNSPECIFIED TYPE: ICD-10-CM

## 2025-04-27 DIAGNOSIS — R42 LIGHTHEADED: ICD-10-CM

## 2025-04-27 DIAGNOSIS — R07.9 CHEST PAIN, UNSPECIFIED TYPE: ICD-10-CM

## 2025-04-27 LAB
ALBUMIN SERPL-MCNC: 4.3 G/DL (ref 3.5–5.2)
ALBUMIN/GLOB SERPL: 1.5 G/DL
ALP SERPL-CCNC: 93 U/L (ref 39–117)
ALT SERPL W P-5'-P-CCNC: 7 U/L (ref 1–33)
ANION GAP SERPL CALCULATED.3IONS-SCNC: 13 MMOL/L (ref 5–15)
AST SERPL-CCNC: 16 U/L (ref 1–32)
BASOPHILS # BLD AUTO: 0.03 10*3/MM3 (ref 0–0.2)
BASOPHILS NFR BLD AUTO: 0.4 % (ref 0–1.5)
BILIRUB SERPL-MCNC: 0.6 MG/DL (ref 0–1.2)
BILIRUB UR QL STRIP: NEGATIVE
BUN SERPL-MCNC: 11 MG/DL (ref 8–23)
BUN/CREAT SERPL: 11.7 (ref 7–25)
CALCIUM SPEC-SCNC: 9.2 MG/DL (ref 8.6–10.5)
CHLORIDE SERPL-SCNC: 103 MMOL/L (ref 98–107)
CLARITY UR: CLEAR
CO2 SERPL-SCNC: 25 MMOL/L (ref 22–29)
COLOR UR: YELLOW
CREAT SERPL-MCNC: 0.94 MG/DL (ref 0.57–1)
DEPRECATED RDW RBC AUTO: 41.1 FL (ref 37–54)
EGFRCR SERPLBLD CKD-EPI 2021: 67.9 ML/MIN/1.73
EOSINOPHIL # BLD AUTO: 0.09 10*3/MM3 (ref 0–0.4)
EOSINOPHIL NFR BLD AUTO: 1.3 % (ref 0.3–6.2)
ERYTHROCYTE [DISTWIDTH] IN BLOOD BY AUTOMATED COUNT: 12.8 % (ref 12.3–15.4)
GEN 5 1HR TROPONIN T REFLEX: <6 NG/L
GLOBULIN UR ELPH-MCNC: 2.9 GM/DL
GLUCOSE SERPL-MCNC: 100 MG/DL (ref 65–99)
GLUCOSE UR STRIP-MCNC: NEGATIVE MG/DL
HCT VFR BLD AUTO: 39.1 % (ref 34–46.6)
HGB BLD-MCNC: 12.8 G/DL (ref 12–15.9)
HGB UR QL STRIP.AUTO: NEGATIVE
IMM GRANULOCYTES # BLD AUTO: 0.01 10*3/MM3 (ref 0–0.05)
IMM GRANULOCYTES NFR BLD AUTO: 0.1 % (ref 0–0.5)
KETONES UR QL STRIP: NEGATIVE
LEUKOCYTE ESTERASE UR QL STRIP.AUTO: NEGATIVE
LYMPHOCYTES # BLD AUTO: 3.47 10*3/MM3 (ref 0.7–3.1)
LYMPHOCYTES NFR BLD AUTO: 51.1 % (ref 19.6–45.3)
MAGNESIUM SERPL-MCNC: 1.9 MG/DL (ref 1.6–2.4)
MCH RBC QN AUTO: 28.6 PG (ref 26.6–33)
MCHC RBC AUTO-ENTMCNC: 32.7 G/DL (ref 31.5–35.7)
MCV RBC AUTO: 87.3 FL (ref 79–97)
MONOCYTES # BLD AUTO: 0.52 10*3/MM3 (ref 0.1–0.9)
MONOCYTES NFR BLD AUTO: 7.7 % (ref 5–12)
NEUTROPHILS NFR BLD AUTO: 2.67 10*3/MM3 (ref 1.7–7)
NEUTROPHILS NFR BLD AUTO: 39.4 % (ref 42.7–76)
NITRITE UR QL STRIP: NEGATIVE
NRBC BLD AUTO-RTO: 0 /100 WBC (ref 0–0.2)
NT-PROBNP SERPL-MCNC: 246.7 PG/ML (ref 0–900)
PH UR STRIP.AUTO: 7 [PH] (ref 5–8)
PLATELET # BLD AUTO: 308 10*3/MM3 (ref 140–450)
PMV BLD AUTO: 10.4 FL (ref 6–12)
POTASSIUM SERPL-SCNC: 4 MMOL/L (ref 3.5–5.2)
PROT SERPL-MCNC: 7.2 G/DL (ref 6–8.5)
PROT UR QL STRIP: NEGATIVE
RBC # BLD AUTO: 4.48 10*6/MM3 (ref 3.77–5.28)
SODIUM SERPL-SCNC: 141 MMOL/L (ref 136–145)
SP GR UR STRIP: <=1.005 (ref 1–1.03)
TROPONIN T NUMERIC DELTA: NORMAL
TROPONIN T SERPL HS-MCNC: <6 NG/L
UROBILINOGEN UR QL STRIP: NORMAL
WBC NRBC COR # BLD AUTO: 6.79 10*3/MM3 (ref 3.4–10.8)

## 2025-04-27 PROCEDURE — 70450 CT HEAD/BRAIN W/O DYE: CPT

## 2025-04-27 PROCEDURE — 81003 URINALYSIS AUTO W/O SCOPE: CPT | Performed by: NURSE PRACTITIONER

## 2025-04-27 PROCEDURE — 93005 ELECTROCARDIOGRAM TRACING: CPT | Performed by: NURSE PRACTITIONER

## 2025-04-27 PROCEDURE — 83735 ASSAY OF MAGNESIUM: CPT | Performed by: NURSE PRACTITIONER

## 2025-04-27 PROCEDURE — 36415 COLL VENOUS BLD VENIPUNCTURE: CPT

## 2025-04-27 PROCEDURE — 99284 EMERGENCY DEPT VISIT MOD MDM: CPT

## 2025-04-27 PROCEDURE — 96374 THER/PROPH/DIAG INJ IV PUSH: CPT

## 2025-04-27 PROCEDURE — 80053 COMPREHEN METABOLIC PANEL: CPT | Performed by: NURSE PRACTITIONER

## 2025-04-27 PROCEDURE — 71045 X-RAY EXAM CHEST 1 VIEW: CPT

## 2025-04-27 PROCEDURE — 85025 COMPLETE CBC W/AUTO DIFF WBC: CPT | Performed by: NURSE PRACTITIONER

## 2025-04-27 PROCEDURE — 84484 ASSAY OF TROPONIN QUANT: CPT | Performed by: NURSE PRACTITIONER

## 2025-04-27 PROCEDURE — 83880 ASSAY OF NATRIURETIC PEPTIDE: CPT | Performed by: NURSE PRACTITIONER

## 2025-04-27 RX ORDER — LABETALOL HYDROCHLORIDE 5 MG/ML
10 INJECTION, SOLUTION INTRAVENOUS ONCE
Status: COMPLETED | OUTPATIENT
Start: 2025-04-27 | End: 2025-04-27

## 2025-04-27 RX ORDER — SODIUM CHLORIDE 0.9 % (FLUSH) 0.9 %
10 SYRINGE (ML) INJECTION AS NEEDED
Status: DISCONTINUED | OUTPATIENT
Start: 2025-04-27 | End: 2025-04-28 | Stop reason: HOSPADM

## 2025-04-27 RX ADMIN — Medication 10 MG: at 19:57

## 2025-04-27 NOTE — ED PROVIDER NOTES
EMERGENCY DEPARTMENT ENCOUNTER    Pt Name: Susannah Cam  MRN: [id number]  Pt :   1960  Room Number:    Date of encounter:  2025  PCP: Meet Patel MD  ED Provider: EFFIE Kirkpatrick    Historian: Patient      HPI:  Chief Complaint: Hypertension, dizziness        Context: Susannah Cam is a 64 y.o. female who presents to the ED c/o hypertension, lightheadedness and chest pain.  Patient reports feeling lightheaded yesterday, checked her blood pressure been elevated to a systolic of 180.  This morning her blood pressure remained elevated, she took her metoprolol in the morning and 2 doses of clonidine with last 1 being about a hour ago prior to arrival to ER.  She felt lightheaded, no blurry vision.  She had chest pain while ambulating from triage to the room.  Describes pain more like pressure and very mild.  She is compliant with medications, former smoker.  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  She feels under the stress lately.      PAST MEDICAL HISTORY  Past Medical History:   Diagnosis Date    Diabetes mellitus     Prediabetes    Hypertension          PAST SURGICAL HISTORY  Past Surgical History:   Procedure Laterality Date    HYSTERECTOMY      OOPHORECTOMY           FAMILY HISTORY  Family History   Problem Relation Age of Onset    Breast cancer Neg Hx     Ovarian cancer Neg Hx     BRCA 1/2 Neg Hx          SOCIAL HISTORY  Social History     Socioeconomic History    Marital status:    Tobacco Use    Smoking status: Former     Types: Cigarettes     Passive exposure: Past    Smokeless tobacco: Never    Tobacco comments:     Quit 21 years ago   Vaping Use    Vaping status: Never Used   Substance and Sexual Activity    Alcohol use: No    Drug use: No    Sexual activity: Defer         ALLERGIES  Cephalexin        REVIEW OF SYSTEMS  Review of Systems       All systems reviewed and negative except for those discussed in HPI.       PHYSICAL EXAM    I have reviewed the  triage vital signs and nursing notes.    ED Triage Vitals   Temp Pulse Resp BP SpO2   -- -- -- -- --      Temp src Heart Rate Source Patient Position BP Location FiO2 (%)   -- -- -- -- --       Physical Exam  GENERAL:   Appears in no acute distress.  Obese  HENT: Nares patent.  EYES: No scleral icterus.  EOMI, PERRLA  CV: Regular rhythm, regular rate.  No pedal edema  RESPIRATORY: Normal effort.  No audible wheezes, rales or rhonchi.  ABDOMEN: Soft, nontender  MUSCULOSKELETAL: No deformities.   NEURO: Alert, moves all extremities, follows commands.  No focal deficits  SKIN: Warm, dry, no rash visualized.      LAB RESULTS  Recent Results (from the past 24 hours)   ECG 12 Lead Chest Pain    Collection Time: 04/27/25  7:31 PM   Result Value Ref Range    QT Interval 398 ms    QTC Interval 423 ms   Comprehensive Metabolic Panel    Collection Time: 04/27/25  7:45 PM    Specimen: Blood   Result Value Ref Range    Glucose 100 (H) 65 - 99 mg/dL    BUN 11 8 - 23 mg/dL    Creatinine 0.94 0.57 - 1.00 mg/dL    Sodium 141 136 - 145 mmol/L    Potassium 4.0 3.5 - 5.2 mmol/L    Chloride 103 98 - 107 mmol/L    CO2 25.0 22.0 - 29.0 mmol/L    Calcium 9.2 8.6 - 10.5 mg/dL    Total Protein 7.2 6.0 - 8.5 g/dL    Albumin 4.3 3.5 - 5.2 g/dL    ALT (SGPT) 7 1 - 33 U/L    AST (SGOT) 16 1 - 32 U/L    Alkaline Phosphatase 93 39 - 117 U/L    Total Bilirubin 0.6 0.0 - 1.2 mg/dL    Globulin 2.9 gm/dL    A/G Ratio 1.5 g/dL    BUN/Creatinine Ratio 11.7 7.0 - 25.0    Anion Gap 13.0 5.0 - 15.0 mmol/L    eGFR 67.9 >60.0 mL/min/1.73   Urinalysis With Culture If Indicated - Urine, Clean Catch    Collection Time: 04/27/25  7:45 PM    Specimen: Urine, Clean Catch   Result Value Ref Range    Color, UA Yellow Yellow, Straw    Appearance, UA Clear Clear    pH, UA 7.0 5.0 - 8.0    Specific Gravity, UA <=1.005 1.001 - 1.030    Glucose, UA Negative Negative    Ketones, UA Negative Negative    Bilirubin, UA Negative Negative    Blood, UA Negative Negative     Protein, UA Negative Negative    Leuk Esterase, UA Negative Negative    Nitrite, UA Negative Negative    Urobilinogen, UA 0.2 E.U./dL 0.2 - 1.0 E.U./dL   High Sensitivity Troponin T    Collection Time: 04/27/25  7:45 PM    Specimen: Blood   Result Value Ref Range    HS Troponin T <6 <14 ng/L   Magnesium    Collection Time: 04/27/25  7:45 PM    Specimen: Blood   Result Value Ref Range    Magnesium 1.9 1.6 - 2.4 mg/dL   CBC Auto Differential    Collection Time: 04/27/25  7:45 PM    Specimen: Blood   Result Value Ref Range    WBC 6.79 3.40 - 10.80 10*3/mm3    RBC 4.48 3.77 - 5.28 10*6/mm3    Hemoglobin 12.8 12.0 - 15.9 g/dL    Hematocrit 39.1 34.0 - 46.6 %    MCV 87.3 79.0 - 97.0 fL    MCH 28.6 26.6 - 33.0 pg    MCHC 32.7 31.5 - 35.7 g/dL    RDW 12.8 12.3 - 15.4 %    RDW-SD 41.1 37.0 - 54.0 fl    MPV 10.4 6.0 - 12.0 fL    Platelets 308 140 - 450 10*3/mm3    Neutrophil % 39.4 (L) 42.7 - 76.0 %    Lymphocyte % 51.1 (H) 19.6 - 45.3 %    Monocyte % 7.7 5.0 - 12.0 %    Eosinophil % 1.3 0.3 - 6.2 %    Basophil % 0.4 0.0 - 1.5 %    Immature Grans % 0.1 0.0 - 0.5 %    Neutrophils, Absolute 2.67 1.70 - 7.00 10*3/mm3    Lymphocytes, Absolute 3.47 (H) 0.70 - 3.10 10*3/mm3    Monocytes, Absolute 0.52 0.10 - 0.90 10*3/mm3    Eosinophils, Absolute 0.09 0.00 - 0.40 10*3/mm3    Basophils, Absolute 0.03 0.00 - 0.20 10*3/mm3    Immature Grans, Absolute 0.01 0.00 - 0.05 10*3/mm3    nRBC 0.0 0.0 - 0.2 /100 WBC   BNP    Collection Time: 04/27/25  7:45 PM    Specimen: Blood   Result Value Ref Range    proBNP 246.7 0.0 - 900.0 pg/mL   High Sensitivity Troponin T 1Hr    Collection Time: 04/27/25  9:50 PM    Specimen: Blood   Result Value Ref Range    HS Troponin T <6 <14 ng/L    Troponin T Numeric Delta         If labs were ordered, I independently reviewed the results and considered them in treating the patient.        RADIOLOGY  CT Head Without Contrast  Result Date: 4/27/2025  CT HEAD WO CONTRAST Date of Exam: 4/27/2025 8:21 PM EDT  Indication: HTN, dizzy. Comparison: None available. Technique: Axial CT images were obtained of the head without contrast administration.  Automated exposure control and iterative construction methods were used. Findings: Superficial soft tissues appear within normal limits. The calvarium is intact.  Paranasal sinuses and mastoid air cells appear well aerated.  Orbits are unremarkable.  There is no acute intracranial hemorrhage.  No mass effect or midline shift.  No abnormal extra-axial collections.  Gray-white differentiation is within normal limits.  There are no focal hypoattenuating lesions.  Ventricular size and configuration is normal for age.     Impression: No acute intracranial abnormality. Electronically Signed: Santy Anaya MD  4/27/2025 8:37 PM EDT  Workstation ID: [id number]    XR Chest 1 View  Result Date: 4/27/2025  XR CHEST 1 VW Date of Exam: 4/27/2025 8:05 PM EDT Indication: Chest pain. Hypertension. Comparison: 2/28/2022 Findings: Cardiac and mediastinal contours are normal. Pulmonary vascularity is normal. The lungs are clear. No pneumothorax.     No active disease. Electronically Signed: Neno Garibay MD  4/27/2025 8:28 PM EDT  Workstation ID: [id number]      I ordered and independently reviewed the above noted radiographic studies.      I viewed images of chest x-ray which showed no lobar pneumonia per my independent interpretation.    See radiologist's dictation for official interpretation.        PROCEDURES    Procedures    ECG 12 Lead Chest Pain   Preliminary Result   Test Reason : Chest Pain   Blood Pressure :   */*   mmHG   Vent. Rate :  68 BPM     Atrial Rate :  68 BPM      P-R Int : 174 ms          QRS Dur :  80 ms       QT Int : 398 ms       P-R-T Axes :  62  -1  12 degrees     QTcB Int : 423 ms      Normal sinus rhythm   Normal ECG   When compared with ECG of 01-Mar-2022 04:15,   Nonspecific T wave abnormality now evident in Inferior leads   QT has shortened      Referred By: edmd            Confirmed By:           MEDICATIONS GIVEN IN ER    Medications   labetalol (NORMODYNE,TRANDATE) injection 10 mg (10 mg Intravenous Given 4/27/25 1957)         MEDICAL DECISION MAKING, PROGRESS, and CONSULTS    All labs, if obtained, have been independently reviewed by me.  All radiology studies, if obtained, have been reviewed by me and the radiologist dictating the report.  All EKG's, if obtained, have been independently viewed and interpreted by me/my attending physician.      Discussion below represents my analysis of pertinent findings related to patient's condition, differential diagnosis, treatment plan and final disposition.  Patient is pleasant 64-year-old female who presents for evaluation of lightheadedness, hypertension and chest pain.  On physical exam she was nontoxic-appearing, hypertensive in triage with initial blood pressure 230/120.  Lab work was unremarkable including negative delta troponin, normal creatinine, stable hemoglobin hematocrit, EKG showed normal sinus rhythm with nonspecific T wave abnormality.  CT head and x-ray of the chest showed no acute abnormality.  Patient received labetalol IV, her blood pressure trended down, upon my evaluation blood pressure was 174/78.  She reported resolution of her symptoms, she was not no longer lightheaded nor she had chest pain.  She has follow-up appointment with her primary care tomorrow morning.  She will discuss blood pressure control with possible medication adjustment at the office tomorrow.  We discussed lifestyle adjustment such as low-sodium diet, stress reduction, exercise, return precautions for any new or worsening of the symptoms.  Patient voiced understanding.        HEART Score: 3                Differential diagnosis:    Hypertensive emergency, hypertensive urgency, acute coronary syndrome, unstable angina, stable angina, electrolyte abnormality      Additional sources:    - Discussed/ obtained information from independent  historians: Spouse    - External (non-ED) record review: 11/5/2024, urgent care visit abrasion of right cornea    - Chronic or social conditions impacting care: Hypertension    - Shared decision making: Patient      Orders placed during this visit:  Orders Placed This Encounter   Procedures    XR Chest 1 View    CT Head Without Contrast    Comprehensive Metabolic Panel    Urinalysis With Culture If Indicated - Urine, Clean Catch    High Sensitivity Troponin T    Magnesium    CBC Auto Differential    BNP    High Sensitivity Troponin T 1Hr    Monitor Blood Pressure    ECG 12 Lead Chest Pain    CBC & Differential         Additional orders considered but not ordered:      ED Course:    Consultants:      ED Course as of 04/28/25 0240   Sun Apr 27, 2025 2229 I reevaluated the patient, she is resting comfortably, she told me she feels actually much better.  Her blood pressure currently 174/78.  She has appointment with her primary care who manages her blood pressure medications tomorrow.  Her blood work was unremarkable including negative delta troponin, normal creatinine 0.94, head CT no acute intracranial abnormality, EKG sinus rhythm with nonspecific T wave abnormality. [IR]      ED Course User Index  [IR] Vandana Joy APRN              Shared Decision Making:  After my consideration of clinical presentation and any laboratory/radiology studies obtained, I discussed the findings with the patient/patient representative who is in agreement with the treatment plan and the final disposition.   Risks and benefits of discharge and/or observation/admission were discussed.       AS OF 02:40 EDT VITALS:    BP - (!) 185/96  HR - 70  TEMP - 98.8 °F (37.1 °C) (Oral)  O2 SATS - 100%                  DIAGNOSIS  Final diagnoses:   Hypertensive urgency   Hypertension, unspecified type   Chest pain, unspecified type   Lightheaded         DISPOSITION  DISCHARGE    Patient discharged in stable condition.    Reviewed implications of  results, diagnosis, meds, responsibility to follow up, warning signs and symptoms of possible worsening, potential complications and reasons to return to ER.    Patient/Family voiced understanding of above instructions.    Discussed plan for discharge, as there is no emergent indication for admission.  Pt/family is agreeable and understands need for follow up and possible repeat testing.  Pt/family is aware that discharge does not mean that nothing is wrong but that it indicates no emergency is currently present that requires admission and they must continue care with follow-up as given below or with a physician of their choice.     FOLLOW-UP  Meet Patel MD  [address]  [facility] [address]  [phone]          [facility] EMERGENCY DEPARTMENT  [address] [place] [address] [phone]             Medication List      No changes were made to your prescriptions during this visit.            Please note that portions of this document were completed with voice recognition software.     EFFIE Kirkpatrick   04/28/25   02:40 EDT        Vandana Joy APRN  04/28/25 0240

## 2025-04-27 NOTE — Clinical Note
[facility] EMERGENCY DEPARTMENT  [address]  [facility] [address]  Phone: [phone]    Susannah Cam was seen and treated in our emergency department on 4/27/2025.  She may return to work on 04/29/2025.         Thank you for choosing [facility].    Jackson Luna MD

## 2025-05-05 LAB
QT INTERVAL: 398 MS
QTC INTERVAL: 423 MS

## 2025-08-04 ENCOUNTER — HOSPITAL ENCOUNTER (EMERGENCY)
Facility: HOSPITAL | Age: 65
Discharge: HOME | End: 2025-08-04
Attending: EMERGENCY MEDICINE | Admitting: EMERGENCY MEDICINE
Payer: MEDICARE

## 2025-08-04 ENCOUNTER — APPOINTMENT (OUTPATIENT)
Dept: GENERAL RADIOLOGY | Facility: HOSPITAL | Age: 65
End: 2025-08-04
Payer: MEDICARE

## 2025-08-04 VITALS
SYSTOLIC BLOOD PRESSURE: 141 MMHG | DIASTOLIC BLOOD PRESSURE: 73 MMHG | BODY MASS INDEX: 32.1 KG/M2 | TEMPERATURE: 98.2 F | OXYGEN SATURATION: 97 % | WEIGHT: 188 LBS | RESPIRATION RATE: 20 BRPM | HEIGHT: 64 IN | HEART RATE: 78 BPM

## 2025-08-04 DIAGNOSIS — I10 HYPERTENSION, UNSPECIFIED TYPE: Primary | ICD-10-CM

## 2025-08-04 LAB
ALBUMIN SERPL-MCNC: 4.2 G/DL (ref 3.5–5.2)
ALBUMIN/GLOB SERPL: 1.2 G/DL
ALP SERPL-CCNC: 91 U/L (ref 39–117)
ALT SERPL W P-5'-P-CCNC: 12 U/L (ref 1–33)
ANION GAP SERPL CALCULATED.3IONS-SCNC: 10 MMOL/L (ref 5–15)
AST SERPL-CCNC: 17 U/L (ref 1–32)
BASOPHILS # BLD AUTO: 0.03 10*3/MM3 (ref 0–0.2)
BASOPHILS NFR BLD AUTO: 0.5 % (ref 0–1.5)
BILIRUB SERPL-MCNC: 0.5 MG/DL (ref 0–1.2)
BUN SERPL-MCNC: 12.8 MG/DL (ref 8–23)
BUN/CREAT SERPL: 14.4 (ref 7–25)
CALCIUM SPEC-SCNC: 9.4 MG/DL (ref 8.6–10.5)
CHLORIDE SERPL-SCNC: 100 MMOL/L (ref 98–107)
CO2 SERPL-SCNC: 28 MMOL/L (ref 22–29)
CREAT SERPL-MCNC: 0.89 MG/DL (ref 0.57–1)
DEPRECATED RDW RBC AUTO: 41.4 FL (ref 37–54)
EGFRCR SERPLBLD CKD-EPI 2021: 72.1 ML/MIN/1.73
EOSINOPHIL # BLD AUTO: 0.1 10*3/MM3 (ref 0–0.4)
EOSINOPHIL NFR BLD AUTO: 1.5 % (ref 0.3–6.2)
ERYTHROCYTE [DISTWIDTH] IN BLOOD BY AUTOMATED COUNT: 12.9 % (ref 12.3–15.4)
GEN 5 1HR TROPONIN T REFLEX: <6 NG/L
GLOBULIN UR ELPH-MCNC: 3.4 GM/DL
GLUCOSE SERPL-MCNC: 96 MG/DL (ref 65–99)
HCT VFR BLD AUTO: 38.9 % (ref 34–46.6)
HGB BLD-MCNC: 12.8 G/DL (ref 12–15.9)
IMM GRANULOCYTES # BLD AUTO: 0.02 10*3/MM3 (ref 0–0.05)
IMM GRANULOCYTES NFR BLD AUTO: 0.3 % (ref 0–0.5)
LYMPHOCYTES # BLD AUTO: 2.93 10*3/MM3 (ref 0.7–3.1)
LYMPHOCYTES NFR BLD AUTO: 45.1 % (ref 19.6–45.3)
MCH RBC QN AUTO: 28.8 PG (ref 26.6–33)
MCHC RBC AUTO-ENTMCNC: 32.9 G/DL (ref 31.5–35.7)
MCV RBC AUTO: 87.6 FL (ref 79–97)
MONOCYTES # BLD AUTO: 0.35 10*3/MM3 (ref 0.1–0.9)
MONOCYTES NFR BLD AUTO: 5.4 % (ref 5–12)
NEUTROPHILS NFR BLD AUTO: 3.06 10*3/MM3 (ref 1.7–7)
NEUTROPHILS NFR BLD AUTO: 47.2 % (ref 42.7–76)
NRBC BLD AUTO-RTO: 0 /100 WBC (ref 0–0.2)
NT-PROBNP SERPL-MCNC: 80.2 PG/ML (ref 0–900)
PLATELET # BLD AUTO: 307 10*3/MM3 (ref 140–450)
PMV BLD AUTO: 10 FL (ref 6–12)
POTASSIUM SERPL-SCNC: 3.8 MMOL/L (ref 3.5–5.2)
PROT SERPL-MCNC: 7.6 G/DL (ref 6–8.5)
QT INTERVAL: 394 MS
QT INTERVAL: 396 MS
QTC INTERVAL: 416 MS
QTC INTERVAL: 436 MS
RBC # BLD AUTO: 4.44 10*6/MM3 (ref 3.77–5.28)
SODIUM SERPL-SCNC: 138 MMOL/L (ref 136–145)
TROPONIN T NUMERIC DELTA: NORMAL
TROPONIN T SERPL HS-MCNC: <6 NG/L
TSH SERPL DL<=0.05 MIU/L-ACNC: 2.09 UIU/ML (ref 0.27–4.2)
WBC NRBC COR # BLD AUTO: 6.49 10*3/MM3 (ref 3.4–10.8)

## 2025-08-04 PROCEDURE — 80053 COMPREHEN METABOLIC PANEL: CPT | Performed by: EMERGENCY MEDICINE

## 2025-08-04 PROCEDURE — 84443 ASSAY THYROID STIM HORMONE: CPT | Performed by: EMERGENCY MEDICINE

## 2025-08-04 PROCEDURE — 84484 ASSAY OF TROPONIN QUANT: CPT | Performed by: EMERGENCY MEDICINE

## 2025-08-04 PROCEDURE — 83880 ASSAY OF NATRIURETIC PEPTIDE: CPT | Performed by: EMERGENCY MEDICINE

## 2025-08-04 PROCEDURE — 93005 ELECTROCARDIOGRAM TRACING: CPT | Performed by: EMERGENCY MEDICINE

## 2025-08-04 PROCEDURE — 85025 COMPLETE CBC W/AUTO DIFF WBC: CPT | Performed by: EMERGENCY MEDICINE

## 2025-08-04 PROCEDURE — 71045 X-RAY EXAM CHEST 1 VIEW: CPT

## 2025-08-04 PROCEDURE — 36415 COLL VENOUS BLD VENIPUNCTURE: CPT

## 2025-08-04 PROCEDURE — 99284 EMERGENCY DEPT VISIT MOD MDM: CPT

## 2025-08-04 PROCEDURE — 93005 ELECTROCARDIOGRAM TRACING: CPT

## 2025-08-04 RX ORDER — SODIUM CHLORIDE 0.9 % (FLUSH) 0.9 %
10 SYRINGE (ML) INJECTION AS NEEDED
Status: DISCONTINUED | OUTPATIENT
Start: 2025-08-04 | End: 2025-08-04 | Stop reason: HOSPADM

## 2025-08-06 ENCOUNTER — LAB (OUTPATIENT)
Dept: LAB | Facility: HOSPITAL | Age: 65
End: 2025-08-06
Payer: MEDICARE

## 2025-08-06 ENCOUNTER — OFFICE VISIT (OUTPATIENT)
Dept: CARDIOLOGY | Facility: CLINIC | Age: 65
End: 2025-08-06
Payer: MEDICARE

## 2025-08-06 VITALS
DIASTOLIC BLOOD PRESSURE: 78 MMHG | SYSTOLIC BLOOD PRESSURE: 160 MMHG | HEART RATE: 89 BPM | BODY MASS INDEX: 32.95 KG/M2 | OXYGEN SATURATION: 99 % | HEIGHT: 64 IN | WEIGHT: 193 LBS

## 2025-08-06 DIAGNOSIS — I26.99 RIGHT PULMONARY EMBOLUS: ICD-10-CM

## 2025-08-06 DIAGNOSIS — R00.2 PALPITATIONS: ICD-10-CM

## 2025-08-06 DIAGNOSIS — I10 ESSENTIAL HYPERTENSION: ICD-10-CM

## 2025-08-06 DIAGNOSIS — I10 ESSENTIAL HYPERTENSION: Primary | ICD-10-CM

## 2025-08-06 DIAGNOSIS — E78.5 HYPERLIPIDEMIA LDL GOAL <100: ICD-10-CM

## 2025-08-06 LAB — CORTIS SERPL-MCNC: 6.11 MCG/DL

## 2025-08-06 PROCEDURE — 36415 COLL VENOUS BLD VENIPUNCTURE: CPT

## 2025-08-06 PROCEDURE — 84244 ASSAY OF RENIN: CPT

## 2025-08-06 PROCEDURE — 82384 ASSAY THREE CATECHOLAMINES: CPT

## 2025-08-06 PROCEDURE — 82533 TOTAL CORTISOL: CPT

## 2025-08-06 PROCEDURE — 82088 ASSAY OF ALDOSTERONE: CPT

## 2025-08-06 PROCEDURE — 83835 ASSAY OF METANEPHRINES: CPT

## 2025-08-06 RX ORDER — METOPROLOL SUCCINATE 50 MG/1
50 TABLET, EXTENDED RELEASE ORAL NIGHTLY
Qty: 90 TABLET | Refills: 2 | Status: SHIPPED | OUTPATIENT
Start: 2025-08-06

## 2025-08-06 RX ORDER — POTASSIUM CHLORIDE 750 MG/1
10 CAPSULE, EXTENDED RELEASE ORAL DAILY
Qty: 90 CAPSULE | Refills: 2 | Status: SHIPPED | OUTPATIENT
Start: 2025-08-06

## 2025-08-06 RX ORDER — NIFEDIPINE 30 MG/1
30 TABLET, EXTENDED RELEASE ORAL DAILY
Qty: 90 TABLET | Refills: 2 | Status: SHIPPED | OUTPATIENT
Start: 2025-08-06

## 2025-08-06 RX ORDER — CHLORTHALIDONE 25 MG/1
12.5 TABLET ORAL DAILY
Qty: 45 TABLET | Refills: 3 | Status: SHIPPED | OUTPATIENT
Start: 2025-08-06

## 2025-08-09 LAB
METANEPH FREE SERPL-MCNC: <25 PG/ML (ref 0–88)
NORMETANEPHRINE SERPL-MCNC: 104.6 PG/ML (ref 0–285.2)

## 2025-08-10 LAB
DOPAMINE SERPL-MCNC: 26.3 PG/ML (ref 0–36.7)
EPINEPH PLAS-MCNC: 28.2 PG/ML (ref 0–55.4)
NOREPINEPH PLAS-MCNC: 1066 PG/ML (ref 115–524)

## 2025-08-11 ENCOUNTER — HOSPITAL ENCOUNTER (OUTPATIENT)
Dept: GENERAL RADIOLOGY | Facility: HOSPITAL | Age: 65
Discharge: HOME | End: 2025-08-11
Admitting: FAMILY MEDICINE
Payer: MEDICARE

## 2025-08-11 ENCOUNTER — TRANSCRIBE ORDERS (OUTPATIENT)
Dept: GENERAL RADIOLOGY | Facility: HOSPITAL | Age: 65
End: 2025-08-11
Payer: MEDICARE

## 2025-08-11 DIAGNOSIS — M54.50 BACK PAIN, LUMBOSACRAL: ICD-10-CM

## 2025-08-11 DIAGNOSIS — M54.50 BACK PAIN, LUMBOSACRAL: Primary | ICD-10-CM

## 2025-08-11 PROCEDURE — 72100 X-RAY EXAM L-S SPINE 2/3 VWS: CPT

## 2025-08-12 LAB
ALDOST SERPL-MCNC: 3 NG/DL (ref 0–30)
ALDOST SERPL-MCNC: 3.7 NG/DL (ref 0–30)
ALDOST/RENIN PLAS-RTO: 4.2 {RATIO} (ref 0–30)
RENIN PLAS-CCNC: 0.88 NG/ML/HR (ref 0.17–5.38)